# Patient Record
Sex: MALE | Race: WHITE | Employment: FULL TIME | URBAN - METROPOLITAN AREA
[De-identification: names, ages, dates, MRNs, and addresses within clinical notes are randomized per-mention and may not be internally consistent; named-entity substitution may affect disease eponyms.]

---

## 2021-02-03 ENCOUNTER — HOSPITAL ENCOUNTER (EMERGENCY)
Age: 36
Discharge: HOME | End: 2021-02-03
Payer: OTHER GOVERNMENT

## 2021-02-03 ENCOUNTER — APPOINTMENT (OUTPATIENT)
Dept: GENERAL RADIOLOGY | Age: 36
End: 2021-02-03
Payer: OTHER GOVERNMENT

## 2021-02-03 VITALS
SYSTOLIC BLOOD PRESSURE: 136 MMHG | OXYGEN SATURATION: 98 % | BODY MASS INDEX: 26.95 KG/M2 | DIASTOLIC BLOOD PRESSURE: 89 MMHG | WEIGHT: 210 LBS | TEMPERATURE: 96.9 F | RESPIRATION RATE: 16 BRPM | HEIGHT: 74 IN | HEART RATE: 98 BPM

## 2021-02-03 DIAGNOSIS — S63.502A SPRAIN OF LEFT WRIST, INITIAL ENCOUNTER: Primary | ICD-10-CM

## 2021-02-03 PROCEDURE — 99283 EMERGENCY DEPT VISIT LOW MDM: CPT

## 2021-02-03 PROCEDURE — 73110 X-RAY EXAM OF WRIST: CPT

## 2021-02-03 RX ORDER — IBUPROFEN 800 MG/1
800 TABLET ORAL EVERY 8 HOURS PRN
Qty: 20 TABLET | Refills: 0 | Status: SHIPPED | OUTPATIENT
Start: 2021-02-03 | End: 2021-07-08

## 2021-02-03 ASSESSMENT — PAIN DESCRIPTION - LOCATION: LOCATION: WRIST

## 2021-02-03 ASSESSMENT — PAIN SCALES - GENERAL: PAINLEVEL_OUTOF10: 7

## 2021-02-03 ASSESSMENT — ENCOUNTER SYMPTOMS
RHINORRHEA: 0
BACK PAIN: 0
DIARRHEA: 0
PHOTOPHOBIA: 0
NAUSEA: 0
ABDOMINAL PAIN: 0
SORE THROAT: 0
VOMITING: 0
EYE PAIN: 0
SHORTNESS OF BREATH: 0
COUGH: 0

## 2021-02-03 ASSESSMENT — PAIN DESCRIPTION - FREQUENCY: FREQUENCY: CONTINUOUS

## 2021-02-03 ASSESSMENT — PAIN DESCRIPTION - PAIN TYPE: TYPE: ACUTE PAIN

## 2021-02-03 ASSESSMENT — PAIN DESCRIPTION - DESCRIPTORS: DESCRIPTORS: ACHING

## 2021-02-03 ASSESSMENT — PAIN DESCRIPTION - ORIENTATION: ORIENTATION: LEFT

## 2021-02-03 NOTE — ED PROVIDER NOTES
[address] [facility] ED  EMERGENCY DEPARTMENT ENCOUNTER      Pt Name: Kiersten Ornelas  MRN: [id number]  [place] 1985  Date of evaluation: 2/3/2021  Provider: Joseline Amin PA-C      HISTORY OF PRESENT ILLNESS    Kiersten Ornelas is a 30 y.o. male who presents to the Emergency Department with left wrist pain that started yesterday after he fell and caught himself. Denies head injury or any other complains. Pain is worse with movement better with rest. He tried a otc brace with little relief. Pain is moderate 7/10. Denies numbness or tingling. REVIEW OF SYSTEMS       Review of Systems   Constitutional: Negative for chills, diaphoresis, fatigue and fever. HENT: Negative for congestion, rhinorrhea and sore throat. Eyes: Negative for photophobia and pain. Respiratory: Negative for cough and shortness of breath. Cardiovascular: Negative for chest pain and palpitations. Gastrointestinal: Negative for abdominal pain, diarrhea, nausea and vomiting. Genitourinary: Negative for dysuria and flank pain. Musculoskeletal: Positive for arthralgias. Negative for back pain. Skin: Negative for rash. Neurological: Negative for dizziness, light-headedness and headaches. Psychiatric/Behavioral: Negative. All other systems reviewed and are negative. PAST MEDICAL HISTORY   History reviewed. No pertinent past medical history. SURGICAL HISTORY     History reviewed. No pertinent surgical history. CURRENT MEDICATIONS       Previous Medications    No medications on file       ALLERGIES     Patient has no known allergies. FAMILY HISTORY     History reviewed. No pertinent family history.        SOCIAL HISTORY       Social History     Socioeconomic History    Marital status:      Spouse name: None    Number of children: None    Years of education: None    Highest education level: None   Occupational History    None   Social Needs    Financial resource strain: None    Food insecurity     Worry: None     Inability: None    Transportation needs     Medical: None     Non-medical: None   Tobacco Use    Smoking status: Never Smoker    Smokeless tobacco: Never Used   Substance and Sexual Activity    Alcohol use: Yes    Drug use: Never    Sexual activity: Yes   Lifestyle    Physical activity     Days per week: None     Minutes per session: None    Stress: None   Relationships    Social connections     Talks on phone: None     Gets together: None     Attends Yazidi service: None     Active member of club or organization: None     Attends meetings of clubs or organizations: None     Relationship status: None    Intimate partner violence     Fear of current or ex partner: None     Emotionally abused: None     Physically abused: None     Forced sexual activity: None   Other Topics Concern    None   Social History Narrative    None       SCREENINGS             PHYSICAL EXAM    (up to 7 for level 4, 8 or more for level 5)     ED Triage Vitals [02/03/21 1439]   BP Temp Temp Source Pulse Resp SpO2 Height Weight   136/89 96.9 °F (36.1 °C) Temporal 98 16 98 % 6' 2\" (1.88 m) 210 lb (95.3 kg)       Physical Exam  Vitals signs and nursing note reviewed. Constitutional:       General: He is not in acute distress. Appearance: Normal appearance. He is well-developed. He is not diaphoretic. HENT:      Head: Normocephalic and atraumatic. Eyes:      General: Lids are normal.      Conjunctiva/sclera: Conjunctivae normal.   Neck:      Musculoskeletal: Normal range of motion and neck supple. Cardiovascular:      Rate and Rhythm: Normal rate and regular rhythm. Pulses: Normal pulses. Heart sounds: Normal heart sounds. Pulmonary:      Effort: Pulmonary effort is normal.      Breath sounds: Normal breath sounds. Abdominal:      General: Bowel sounds are normal.      Palpations: Abdomen is soft. Tenderness: There is no abdominal tenderness.    Musculoskeletal: Left wrist: He exhibits tenderness and bony tenderness. He exhibits normal range of motion, no swelling, no effusion, no crepitus, no deformity and no laceration. Arms:       Comments: N/v intact. Generalized ttp to wrist. No point tenderness to anatomical snuff box    Lymphadenopathy:      Cervical: No cervical adenopathy. Skin:     General: Skin is warm and dry. Capillary Refill: Capillary refill takes less than 2 seconds. Findings: No rash. Neurological:      Mental Status: He is alert and oriented to person, place, and time. Psychiatric:         Thought Content: Thought content normal.         Judgment: Judgment normal.           All other labs were within normal range or not returned as of this dictation. EMERGENCY DEPARTMENT COURSE and DIFFERENTIALDIAGNOSIS/MDM:   Vitals:    Vitals:    02/03/21 1439   BP: 136/89   Pulse: 98   Resp: 16   Temp: 96.9 °F (36.1 °C)   TempSrc: Temporal   SpO2: 98%   Weight: 210 lb (95.3 kg)   Height: 6' 2\" (1.88 m)            Xray negative. Will treat as a sprain. Given support brace. Rice and nsaids. F/u with pcp or ortho in a few days. PROCEDURES:  Unless otherwise noted below, none     Procedures      FINAL IMPRESSION      1.  Sprain of left wrist, initial encounter          DISPOSITION/PLAN   DISPOSITION Decision To Discharge 02/03/2021 03:22:16 PM          Vinod Arauz PA-C (electronically signed)  Attending Emergency Physician       Vinod Arauz PA-C  02/03/21 5029

## 2021-06-11 ENCOUNTER — OFFICE VISIT (OUTPATIENT)
Dept: ENDOCRINOLOGY | Age: 36
End: 2021-06-11
Payer: OTHER GOVERNMENT

## 2021-06-11 VITALS
DIASTOLIC BLOOD PRESSURE: 77 MMHG | WEIGHT: 180 LBS | HEIGHT: 74 IN | SYSTOLIC BLOOD PRESSURE: 110 MMHG | OXYGEN SATURATION: 98 % | BODY MASS INDEX: 23.1 KG/M2

## 2021-06-11 DIAGNOSIS — E10.65 TYPE I DIABETES MELLITUS WITH HYPEROSMOLAR COMA (HCC): Primary | ICD-10-CM

## 2021-06-11 DIAGNOSIS — E10.69 TYPE I DIABETES MELLITUS WITH HYPEROSMOLAR COMA (HCC): ICD-10-CM

## 2021-06-11 DIAGNOSIS — E10.69 TYPE I DIABETES MELLITUS WITH HYPEROSMOLAR COMA (HCC): Primary | ICD-10-CM

## 2021-06-11 DIAGNOSIS — E87.0 TYPE I DIABETES MELLITUS WITH HYPEROSMOLAR COMA (HCC): Primary | ICD-10-CM

## 2021-06-11 DIAGNOSIS — E10.65 TYPE I DIABETES MELLITUS WITH HYPEROSMOLAR COMA (HCC): ICD-10-CM

## 2021-06-11 DIAGNOSIS — R53.83 FATIGUE, UNSPECIFIED TYPE: ICD-10-CM

## 2021-06-11 DIAGNOSIS — E87.0 TYPE I DIABETES MELLITUS WITH HYPEROSMOLAR COMA (HCC): ICD-10-CM

## 2021-06-11 DIAGNOSIS — R63.4 WEIGHT LOSS: ICD-10-CM

## 2021-06-11 LAB
CHP ED QC CHECK: NORMAL
GLUCOSE BLD-MCNC: 162 MG/DL
T4 FREE: 1.08 NG/DL (ref 0.84–1.68)
TSH SERPL DL<=0.05 MIU/L-ACNC: 3.65 UIU/ML (ref 0.44–3.86)

## 2021-06-11 PROCEDURE — 82962 GLUCOSE BLOOD TEST: CPT | Performed by: INTERNAL MEDICINE

## 2021-06-11 PROCEDURE — 95250 CONT GLUC MNTR PHYS/QHP EQP: CPT | Performed by: INTERNAL MEDICINE

## 2021-06-11 PROCEDURE — 99204 OFFICE O/P NEW MOD 45 MIN: CPT | Performed by: INTERNAL MEDICINE

## 2021-06-11 RX ORDER — BLOOD-GLUCOSE TRANSMITTER
EACH MISCELLANEOUS
Qty: 1 EACH | Refills: 3 | Status: SHIPPED | OUTPATIENT
Start: 2021-06-11

## 2021-06-11 RX ORDER — FLASH GLUCOSE SCANNING READER
EACH MISCELLANEOUS
Qty: 1 DEVICE | Refills: 0 | Status: SHIPPED | OUTPATIENT
Start: 2021-06-11 | End: 2021-07-08

## 2021-06-11 RX ORDER — GLUCAGON INJECTION, SOLUTION 1 MG/.2ML
INJECTION, SOLUTION SUBCUTANEOUS
Qty: 2 SYRINGE | Refills: 3 | Status: SHIPPED | OUTPATIENT
Start: 2021-06-11

## 2021-06-11 RX ORDER — BLOOD-GLUCOSE,RECEIVER,CONT
EACH MISCELLANEOUS
Qty: 1 DEVICE | Refills: 0 | Status: SHIPPED | OUTPATIENT
Start: 2021-06-11

## 2021-06-11 RX ORDER — INSULIN GLARGINE 100 [IU]/ML
INJECTION, SOLUTION SUBCUTANEOUS
Qty: 5 PEN | Refills: 3
Start: 2021-06-11 | End: 2021-07-08 | Stop reason: SDUPTHER

## 2021-06-11 RX ORDER — INSULIN ASPART 100 [IU]/ML
100 INJECTION, SOLUTION INTRAVENOUS; SUBCUTANEOUS 3 TIMES DAILY
COMMUNITY
Start: 2021-06-08 | End: 2021-07-08 | Stop reason: SDUPTHER

## 2021-06-11 RX ORDER — FLASH GLUCOSE SENSOR
KIT MISCELLANEOUS
Qty: 2 EACH | Refills: 3 | Status: SHIPPED | OUTPATIENT
Start: 2021-06-11 | End: 2021-07-08

## 2021-06-11 RX ORDER — INSULIN GLARGINE 100 [IU]/ML
15 INJECTION, SOLUTION SUBCUTANEOUS ONCE
COMMUNITY
Start: 2021-06-07 | End: 2021-06-11 | Stop reason: SDUPTHER

## 2021-06-11 RX ORDER — BLOOD-GLUCOSE SENSOR
EACH MISCELLANEOUS
Qty: 1 EACH | Refills: 11 | Status: SHIPPED | OUTPATIENT
Start: 2021-06-11

## 2021-06-11 ASSESSMENT — ENCOUNTER SYMPTOMS
VISUAL CHANGE: 0
EYES NEGATIVE: 1

## 2021-06-11 NOTE — PROGRESS NOTES
6/11/2021    Assessment:       Diagnosis Orders   1. Type 2 diabetes mellitus with other specified complication, with long-term current use of insulin ([facility])  POCT Glucose    Hemoglobin J7L    Basic Metabolic Panel, Fasting    HM DIABETES FOOT EXAM         PLAN:     Orders Placed This Encounter   Procedures    Basic Metabolic Panel, Fasting     Standing Status:   Future     Standing Expiration Date:   6/11/2022    C-Peptide     Standing Status:   Future     Number of Occurrences:   1     Standing Expiration Date:   6/11/2022    Glutamic Acid Decarboxylase     Standing Status:   Future     Number of Occurrences:   1     Standing Expiration Date:   6/11/2022    T4, Free     Standing Status:   Future     Number of Occurrences:   1     Standing Expiration Date:   6/11/2022    TSH without Reflex     Standing Status:   Future     Number of Occurrences:   1     Standing Expiration Date:   6/11/2022    Thyroid Peroxidase Antibody     Standing Status:   Future     Number of Occurrences:   1     Standing Expiration Date:   6/11/2022    Testosterone Free And Total Male     Standing Status:   Future     Number of Occurrences:   1     Standing Expiration Date:   6/11/2022    Ambulatory referral to Family Practice     Referral Priority:   Routine     Referral Type:   Eval and Treat     Referred to Provider:   aCn Chawla MD     Requested Specialty:   Family Medicine     Number of Visits Requested:   1    POCT Glucose     DIABETES FOOT EXAM    [place] CONT GLUC MNTR PHYSICIAN/QHP PROVIDED EQUIPTMENT     Diabetes education provided today:    Nutrition as a mainstream of diabetes therapy. [place] about label reading. Insulin pumps, how they work and how they affect blood sugar levels. Continuous Glucose monitor. How it works and checks blood sugars every 5 min. for 4 days during our tests. Managing high and low sugar readings.     Orders Placed This Encounter   Medications    Glucagon (Ardella Montgomery 2-PACK) 1 MG/0.2ML SOAJ     Sig: As needed     Dispense:  2 Syringe     Refill:  3    BASAGLAR KWIKPEN 100 UNIT/ML injection pen     Sig: 15 units in am 15 units evening     Dispense:  5 pen     Refill:  3     Lower Basaglar to 15 units twice a day continue NovoLog 10 units with each meals plus sliding scale patient educated extensively regarding insulin pump and continuous glucose monitoring for better control long-term A1c goal of 6.5 or lower educated about hypoglycemia given prescription for subcutaneous glucagon  More than 50% of 45-minute spent patient education counseling      Orders Placed This Encounter   Procedures    POCT Glucose       Subjective:     Chief Complaint   Patient presents with    New Patient    Follow-Up from [id number]:    06/11/21 1337   BP: 110/77   SpO2: 98%   Weight: 180 lb (81.6 kg)   Height: 6' 2\" (1.88 m)     Wt Readings from Last 3 Encounters:   06/11/21 180 lb (81.6 kg)   02/03/21 210 lb (95.3 kg)     BP Readings from Last 3 Encounters:   06/11/21 110/77   02/03/21 136/89     Patient referred here for new onset type 1 diabetes admitted recently at [facility] for diabetic ketoacidosis was in the hospital for 3 days patient symptoms included polyuria polydipsia fatigue weight loss denied any blurred vision was sent home on Basaglar takes 15 units in the morning 20 units at night plus NovoLog 10 units 3 times a day plus sliding scale blood sugars have improved since his admission patient is on low-carb keto diet is interested in continuous glucose monitoring/pump history significant for type 1 diabetes  No labs are available for review currently patient is A1c was close to 15  Patient also works at [employer] 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 He has type 1 diabetes mellitus. His disease course has been improving. There are no hypoglycemic associated symptoms.  Associated symptoms include fatigue, polydipsia, polyuria, weakness and weight loss. Pertinent negatives for diabetes include no visual change. There are no hypoglycemic complications. Current diabetic treatment includes insulin injections. He is currently taking insulin pre-breakfast, pre-lunch, pre-dinner and at bedtime. He is following a generally healthy diet. Meal planning includes avoidance of concentrated sweets. His overall blood glucose range is >200 mg/dl. (Reviewed his blood sugar logs testing 9 times daily averages are around in the 200 range no severe hypoglycemia)     Patient Active Problem List   Diagnosis    DM w/ coma type I, uncontrolled ([facility] [address]         Social History     Socioeconomic History    Marital status:      Spouse name: Not on file    Number of children: Not on file    Years of education: Not on file    Highest education level: Not on file   Occupational History    Not on file   Tobacco Use    Smoking status: Never Smoker    Smokeless tobacco: Never Used   Vaping Use    Vaping Use: Never used   Substance and Sexual Activity    Alcohol use: Yes    Drug use: Never    Sexual activity: Yes   Other Topics Concern    Not on file   Social History Narrative    Not on file     Social Determinants of Health     Financial Resource Strain:     Difficulty of Paying Living Expenses:    Food Insecurity:     Worried About Running Out of Food in the Last Year:     [address] in the Last Year:    Transportation Needs:     Lack of Transportation (Medical):      Lack of Transportation (Non-Medical):    Physical Activity:     Days of Exercise per Week:     Minutes of Exercise per Session:    Stress:     Feeling of Stress :    Social Connections:     Frequency of Communication with Friends and Family:     Frequency of Social Gatherings with Friends and Family:     Attends Tenriism Services:     Active Member of Clubs or Organizations:     Attends Club or Organization Meetings:     Marital Status:    Intimate Normal range of motion. Cervical back: Normal range of motion and neck supple. Feet:    Skin:     General: Skin is warm and dry. Neurological:      General: No focal deficit present. Mental Status: He is alert and oriented to person, place, and time.    Psychiatric:         Mood and Affect: Mood normal.         Behavior: Behavior normal.

## 2021-06-12 LAB — C-PEPTIDE: 0.8 NG/ML (ref 1.1–4.4)

## 2021-06-13 LAB
SEX HORMONE BINDING GLOBULIN: 26 NMOL/L (ref 11–80)
TESTOSTERONE FREE PERCENT: 2 % (ref 1.6–2.9)
TESTOSTERONE FREE, CALC: 61 PG/ML (ref 47–244)
TESTOSTERONE TOTAL-MALE: 300 NG/DL (ref 300–1080)

## 2021-06-14 LAB
GLUTAMIC ACID DECARB AB: 6 IU/ML (ref 0–5)
THYROID PEROXIDASE (TPO) ABS: <4 IU/ML (ref 0–25)

## 2021-06-24 ENCOUNTER — NURSE ONLY (OUTPATIENT)
Dept: ENDOCRINOLOGY | Age: 36
End: 2021-06-24
Payer: OTHER GOVERNMENT

## 2021-06-24 DIAGNOSIS — E10.69 TYPE I DIABETES MELLITUS WITH HYPEROSMOLAR COMA (HCC): Primary | ICD-10-CM

## 2021-06-24 DIAGNOSIS — E87.0 TYPE I DIABETES MELLITUS WITH HYPEROSMOLAR COMA (HCC): Primary | ICD-10-CM

## 2021-06-24 DIAGNOSIS — E10.65 TYPE I DIABETES MELLITUS WITH HYPEROSMOLAR COMA (HCC): Primary | ICD-10-CM

## 2021-06-24 PROCEDURE — 95251 CONT GLUC MNTR ANALYSIS I&R: CPT | Performed by: INTERNAL MEDICINE

## 2021-06-24 RX ORDER — PEN NEEDLE, DIABETIC 32GX 5/32"
NEEDLE, DISPOSABLE MISCELLANEOUS
Qty: 100 EACH | Refills: 3 | Status: SHIPPED | OUTPATIENT
Start: 2021-06-24 | End: 2021-07-29 | Stop reason: SDUPTHER

## 2021-06-24 RX ORDER — BLOOD SUGAR DIAGNOSTIC
STRIP MISCELLANEOUS
Qty: 150 EACH | Refills: 3 | Status: SHIPPED | OUTPATIENT
Start: 2021-06-24

## 2021-07-08 ENCOUNTER — OFFICE VISIT (OUTPATIENT)
Dept: ENDOCRINOLOGY | Age: 36
End: 2021-07-08
Payer: OTHER GOVERNMENT

## 2021-07-08 VITALS — HEART RATE: 66 BPM | WEIGHT: 193 LBS | BODY MASS INDEX: 24.77 KG/M2 | HEIGHT: 74 IN | OXYGEN SATURATION: 97 %

## 2021-07-08 DIAGNOSIS — E10.65 TYPE I DIABETES MELLITUS WITH HYPEROSMOLAR COMA (HCC): Primary | ICD-10-CM

## 2021-07-08 DIAGNOSIS — E10.69 TYPE I DIABETES MELLITUS WITH HYPEROSMOLAR COMA (HCC): Primary | ICD-10-CM

## 2021-07-08 DIAGNOSIS — E87.0 TYPE I DIABETES MELLITUS WITH HYPEROSMOLAR COMA (HCC): Primary | ICD-10-CM

## 2021-07-08 LAB
CHP ED QC CHECK: NORMAL
GLUCOSE BLD-MCNC: 101 MG/DL
HBA1C MFR BLD: 9.4 %

## 2021-07-08 PROCEDURE — 82962 GLUCOSE BLOOD TEST: CPT | Performed by: INTERNAL MEDICINE

## 2021-07-08 PROCEDURE — 83036 HEMOGLOBIN GLYCOSYLATED A1C: CPT | Performed by: INTERNAL MEDICINE

## 2021-07-08 PROCEDURE — 99214 OFFICE O/P EST MOD 30 MIN: CPT | Performed by: INTERNAL MEDICINE

## 2021-07-08 RX ORDER — INSULIN GLARGINE 100 [IU]/ML
INJECTION, SOLUTION SUBCUTANEOUS
Qty: 5 PEN | Refills: 3
Start: 2021-07-08 | End: 2021-07-14 | Stop reason: SDUPTHER

## 2021-07-08 RX ORDER — INSULIN ASPART 100 [IU]/ML
INJECTION, SOLUTION INTRAVENOUS; SUBCUTANEOUS
Qty: 5 PEN | Refills: 3
Start: 2021-07-08 | End: 2021-09-24

## 2021-07-08 NOTE — PROGRESS NOTES
2021    Assessment:       Diagnosis Orders   1. Type I diabetes mellitus with hyperosmolar coma (HCC)  POCT Glucose    POCT glycosylated hemoglobin (Hb A1C)    Microalbumin / Creatinine Urine Ratio    Lipid, Fasting    Basic Metabolic Panel, Fasting         PLAN:     Orders Placed This Encounter   Procedures    Microalbumin / Creatinine Urine Ratio     Standing Status:   Future     Standing Expiration Date:   2022    Lipid, Fasting     Standing Status:   Future     Standing Expiration Date:   2022    Basic Metabolic Panel, Fasting     Standing Status:   Future     Standing Expiration Date:   2022    POCT Glucose    POCT glycosylated hemoglobin (Hb A1C)     Adjust dose of NovoLog to 40 units in the morning 8 at lunch 14 at dinner plus sliding scale adjust Basaglar to 12 in the morning 20 2 in the evening  Patient also was given samples of Afrezza insulin which she could use instead of NovoLog currently using same dose of NovoLog  Will get a baseline PFT  Discussed about insulin pump patient wants to hold off on  Repeat BMP A1c in 3 weeks wants to get A1c below 9  For the 50% of 30 minutes spent patient education counseling  Orders Placed This Encounter   Medications    NOVOLOG FLEXPEN 100 UNIT/ML injection pen     Si units am 8 units lunch and 14 units dinner plus sliding scale     Dispense:  5 pen     Refill:  3    BASAGLAR KWIKPEN 100 UNIT/ML injection pen     Si units in am 22 units evening     Dispense:  5 pen     Refill:  3    Insulin Regular Human 4 & 8 & 12 units POWD     Sig: Take 16 units  Am 8 units lunch and 16 units dinner     Dispense:  60 each     Refill:  3           Orders Placed This Encounter   Procedures    POCT Glucose    POCT glycosylated hemoglobin (Hb A1C)     No orders of the defined types were placed in this encounter. No follow-ups on file.   Subjective:     Chief Complaint   Patient presents with    Diabetes     Vitals:    21 1502   Weight: 193 lb (87.5 kg)   Height: 6' 2\" (1.88 m)     Wt Readings from Last 3 Encounters:   07/08/21 193 lb (87.5 kg)   06/11/21 180 lb (81.6 kg)   02/03/21 210 lb (95.3 kg)     BP Readings from Last 3 Encounters:   06/11/21 110/77   02/03/21 136/89     Follow-up on type 1 diabetes patient is Lantus twice a day and NovoLog 3 times a day also using Dexcom continuous glucose monitoring patient also had freestyle massiel continuous glucose monitoring which we reviewed with him including his Dexcom report  Overall trend is improving A1c is also improved significantly  Follow-up current blood sugars are higher late night early morning and later in the evening  A1c has improved to 9.4 from above 14    Diabetes  He presents for his follow-up diabetic visit. He has type 1 diabetes mellitus. There are no hypoglycemic complications. There are no diabetic complications. Current diabetic treatment includes insulin injections. He is currently taking insulin pre-breakfast, pre-lunch, pre-dinner and at bedtime. His overall blood glucose range is 140-180 mg/dl. (Lab Results       Component                Value               Date                       LABA1C                   9.4                 07/08/2021            Review Dexcom reading average blood sugars are close to normal 170 lowest blood sugar was 65 highest was 2 8868% time in range less than 1% low 0% very low  Reviewed freestyle massiel readings average blood sugars in the 165  Range  61% time in range  31% high  1% low  0% very low)     Past Medical History:   Diagnosis Date    DM w/ coma type I, uncontrolled ([address] 6/11/2021     History reviewed. No pertinent surgical history.   Social History     Socioeconomic History    Marital status:      Spouse name: Not on file    Number of children: Not on file    Years of education: Not on file    Highest education level: Not on file   Occupational History    Not on file   Tobacco Use    Smoking status: Never Smoker    Smokeless tobacco: Never Used   Vaping Use    Vaping Use: Never used   Substance and Sexual Activity    Alcohol use: Yes    Drug use: Never    Sexual activity: Yes   Other Topics Concern    Not on file   Social History Narrative    Not on file     Social Determinants of Health     Financial Resource Strain:     Difficulty of Paying Living Expenses:    Food Insecurity:     Worried About Running Out of Food in the Last Year:     [address] in the Last Year:    Transportation Needs:     Lack of Transportation (Medical):  Lack of Transportation (Non-Medical):    Physical Activity:     Days of Exercise per Week:     Minutes of Exercise per Session:    Stress:     Feeling of Stress :    Social Connections:     Frequency of Communication with Friends and Family:     Frequency of Social Gatherings with Friends and Family:     Attends Restorationism Services:     Active Member of Clubs or Organizations:     Attends Club or Organization Meetings:     Marital Status:    Intimate Partner Violence:     Fear of Current or Ex-Partner:     Emotionally Abused:     Physically Abused:     Sexually Abused:      History reviewed. No pertinent family history.   No Known Allergies    Current Outpatient Medications:     Insulin Pen Needle (RELION PEN NEEDLES) 31G X 6 MM MISC, Use 5 daily with insulin, Disp: 100 each, Rfl: 3    blood glucose test strips (ACCU-CHEK GUIDE) strip, Pt test 4x daily Dx E10.65, Disp: 150 each, Rfl: 3    NOVOLOG FLEXPEN 100 UNIT/ML injection pen, Inject 100 Units into the skin 3 times daily, Disp: , Rfl:     Glucagon (GVOKE HYPOPEN 2-PACK) 1 MG/0.2ML SOAJ, As needed, Disp: 2 Syringe, Rfl: 3    BASAGLAR KWIKPEN 100 UNIT/ML injection pen, 15 units in am 15 units evening, Disp: 5 pen, Rfl: 3    Continuous Blood Gluc Transmit (DEXCOM G6 TRANSMITTER) MISC, Change every 3 months, Disp: 1 each, Rfl: 3    Continuous Blood Gluc Sensor (DEXCOM G6 SENSOR) MISC, Change every 10 days, Disp: 1 each, Rfl: 11    Continuous Blood Gluc  (DEXCOM G6 ) CORY, Continuous, Disp: 1 Device, Rfl: 0  Lab Results   Component Value Date    GLUCOSE 101 07/08/2021     No results found for: WBC, HGB, HCT, MCV, PLT  Lab Results   Component Value Date    LABA1C 9.4 07/08/2021     No results found for: CHOLFAST, TRIGLYCFAST, HDL, LDLCALC, CHOL, TRIG  Lab Results   Component Value Date    TESTM 300 06/11/2021     Lab Results   Component Value Date    TSH 3.650 06/11/2021    T4FREE 1.08 06/11/2021     Lab Results   Component Value Date    TPOABS <4.0 06/11/2021       Review of Systems   Cardiovascular: Negative. Endocrine: Negative. All other systems reviewed and are negative. Objective:   Physical Exam  Vitals reviewed. Constitutional:       Appearance: Normal appearance. HENT:      Head: Normocephalic and atraumatic. Hair is normal.      Right Ear: External ear normal.      Left Ear: External ear normal.      Nose: Nose normal.   Eyes:      General: No scleral icterus. Right eye: No discharge. Left eye: No discharge. Extraocular Movements: Extraocular movements intact. Conjunctiva/sclera: Conjunctivae normal.   Neck:      Trachea: Trachea normal.   Cardiovascular:      Rate and Rhythm: Normal rate. Pulmonary:      Effort: Pulmonary effort is normal.   Musculoskeletal:         General: Normal range of motion. Cervical back: Normal range of motion and neck supple. Neurological:      General: No focal deficit present. Mental Status: He is alert and oriented to person, place, and time.    Psychiatric:         Mood and Affect: Mood normal.         Behavior: Behavior normal.

## 2021-07-14 RX ORDER — INSULIN GLARGINE 100 [IU]/ML
INJECTION, SOLUTION SUBCUTANEOUS
Qty: 2 PEN | Refills: 0 | COMMUNITY
Start: 2021-07-14 | End: 2022-07-29

## 2021-07-14 RX ORDER — INSULIN GLARGINE 100 [IU]/ML
INJECTION, SOLUTION SUBCUTANEOUS
Qty: 10 PEN | Refills: 3 | Status: SHIPPED | OUTPATIENT
Start: 2021-07-14 | End: 2021-07-14 | Stop reason: SDUPTHER

## 2021-07-22 DIAGNOSIS — E10.69 TYPE I DIABETES MELLITUS WITH HYPEROSMOLAR COMA (HCC): ICD-10-CM

## 2021-07-22 DIAGNOSIS — E87.0 TYPE I DIABETES MELLITUS WITH HYPEROSMOLAR COMA (HCC): ICD-10-CM

## 2021-07-22 DIAGNOSIS — E10.65 TYPE I DIABETES MELLITUS WITH HYPEROSMOLAR COMA (HCC): ICD-10-CM

## 2021-07-22 LAB
ANION GAP SERPL CALCULATED.3IONS-SCNC: 16 MEQ/L (ref 9–15)
BUN BLDV-MCNC: 15 MG/DL (ref 6–20)
CALCIUM SERPL-MCNC: 9.8 MG/DL (ref 8.5–9.9)
CHLORIDE BLD-SCNC: 100 MEQ/L (ref 95–107)
CHOLESTEROL, FASTING: 166 MG/DL (ref 0–199)
CO2: 26 MEQ/L (ref 20–31)
CREAT SERPL-MCNC: 0.8 MG/DL (ref 0.7–1.2)
CREATININE URINE: 28.6 MG/DL
GFR AFRICAN AMERICAN: >60
GFR NON-AFRICAN AMERICAN: >60
GLUCOSE FASTING: 127 MG/DL (ref 70–99)
HBA1C MFR BLD: 8 % (ref 4.8–5.9)
HDLC SERPL-MCNC: 55 MG/DL (ref 40–59)
LDL CHOLESTEROL CALCULATED: 99 MG/DL (ref 0–129)
MICROALBUMIN UR-MCNC: <1.2 MG/DL
MICROALBUMIN/CREAT UR-RTO: NORMAL MG/G (ref 0–30)
POTASSIUM SERPL-SCNC: 3.9 MEQ/L (ref 3.4–4.9)
SODIUM BLD-SCNC: 142 MEQ/L (ref 135–144)
TRIGLYCERIDE, FASTING: 58 MG/DL (ref 0–150)

## 2021-07-29 RX ORDER — PEN NEEDLE, DIABETIC 32GX 5/32"
NEEDLE, DISPOSABLE MISCELLANEOUS
Qty: 100 EACH | Refills: 3 | Status: SHIPPED | OUTPATIENT
Start: 2021-07-29

## 2021-08-05 ENCOUNTER — OFFICE VISIT (OUTPATIENT)
Dept: ENDOCRINOLOGY | Age: 36
End: 2021-08-05
Payer: OTHER GOVERNMENT

## 2021-08-05 VITALS
BODY MASS INDEX: 23.61 KG/M2 | WEIGHT: 184 LBS | HEART RATE: 98 BPM | OXYGEN SATURATION: 98 % | SYSTOLIC BLOOD PRESSURE: 130 MMHG | DIASTOLIC BLOOD PRESSURE: 82 MMHG | HEIGHT: 74 IN | TEMPERATURE: 96.8 F

## 2021-08-05 DIAGNOSIS — E10.65 TYPE I DIABETES MELLITUS WITH HYPEROSMOLAR COMA (HCC): Primary | ICD-10-CM

## 2021-08-05 DIAGNOSIS — E87.0 TYPE I DIABETES MELLITUS WITH HYPEROSMOLAR COMA (HCC): Primary | ICD-10-CM

## 2021-08-05 DIAGNOSIS — E10.69 TYPE I DIABETES MELLITUS WITH HYPEROSMOLAR COMA (HCC): Primary | ICD-10-CM

## 2021-08-05 LAB
CHP ED QC CHECK: NORMAL
GLUCOSE BLD-MCNC: 114 MG/DL

## 2021-08-05 PROCEDURE — 82962 GLUCOSE BLOOD TEST: CPT | Performed by: INTERNAL MEDICINE

## 2021-08-05 PROCEDURE — 99213 OFFICE O/P EST LOW 20 MIN: CPT | Performed by: INTERNAL MEDICINE

## 2021-08-05 PROCEDURE — 3052F HG A1C>EQUAL 8.0%<EQUAL 9.0%: CPT | Performed by: INTERNAL MEDICINE

## 2021-08-05 NOTE — PROGRESS NOTES
8/5/2021    Assessment:       Diagnosis Orders   1. Type I diabetes mellitus with hyperosmolar coma (HCC)  POCT Glucose    Hemoglobin U8V    Basic Metabolic Panel, Fasting         PLAN:     Orders Placed This Encounter   Procedures    Hemoglobin A1C     Standing Status:   Future     Standing Expiration Date:   8/5/2022    Basic Metabolic Panel, Fasting     Standing Status:   Future     Standing Expiration Date:   8/5/2022    Microalbumin / Creatinine Urine Ratio     Standing Status:   Future     Standing Expiration Date:   8/5/2022    POCT Glucose     Continue current dose of Basaglar at 12 units in the morning 22 units in the evening plus Afrezza  3 times a day  A1c goal of 7 or lower        Orders Placed This Encounter   Procedures    POCT Glucose       Subjective:     Chief Complaint   Patient presents with    Diabetes     Vitals:    08/05/21 1423   BP: 130/82   Pulse: 98   Temp: 96.8 °F (36 °C)   SpO2: 98%   Weight: 184 lb (83.5 kg)   Height: 6' 2\" (1.88 m)     Wt Readings from Last 3 Encounters:   08/05/21 184 lb (83.5 kg)   07/08/21 193 lb (87.5 kg)   06/11/21 180 lb (81.6 kg)     BP Readings from Last 3 Encounters:   08/05/21 130/82   06/11/21 110/77   02/03/21 136/89     Reviewed 4 week cgms dexcom  Patient on Basaglar 12 units in the morning 22 at night plus    Inhaler insulin 3 times daily    Diabetes  He presents for his follow-up diabetic visit. He has type 1 diabetes mellitus. Symptoms are improving. Current diabetic treatment includes insulin injections. He is currently taking insulin pre-breakfast, pre-lunch, pre-dinner and at bedtime. His overall blood glucose range is 130-140 mg/dl.  (Lab Results       Component                Value               Date                       LABA1C                   8.0 (H)             07/22/2021              30 day average was 147   82 % in range   )     Past Medical History:   Diagnosis Date    DM w/ coma type I, uncontrolled ([address] 6/11/2021     No past surgical history on file. Social History     Socioeconomic History    Marital status:      Spouse name: Not on file    Number of children: Not on file    Years of education: Not on file    Highest education level: Not on file   Occupational History    Not on file   Tobacco Use    Smoking status: Never Smoker    Smokeless tobacco: Never Used   Vaping Use    Vaping Use: Never used   Substance and Sexual Activity    Alcohol use: Yes    Drug use: Never    Sexual activity: Yes   Other Topics Concern    Not on file   Social History Narrative    Not on file     Social Determinants of Health     Financial Resource Strain:     Difficulty of Paying Living Expenses:    Food Insecurity:     Worried About Running Out of Food in the Last Year:     [address] in the Last Year:    Transportation Needs:     Lack of Transportation (Medical):  Lack of Transportation (Non-Medical):    Physical Activity:     Days of Exercise per Week:     Minutes of Exercise per Session:    Stress:     Feeling of Stress :    Social Connections:     Frequency of Communication with Friends and Family:     Frequency of Social Gatherings with Friends and Family:     Attends Judaism Services:     Active Member of Clubs or Organizations:     Attends Club or Organization Meetings:     Marital Status:    Intimate Partner Violence:     Fear of Current or Ex-Partner:     Emotionally Abused:     Physically Abused:     Sexually Abused:      No family history on file.   No Known Allergies    Current Outpatient Medications:     Insulin Pen Needle (RELION PEN NEEDLES) 31G X 6 MM MISC, Use 5 daily with insulin, Disp: 100 each, Rfl: 3    BASAGLAR KWIKPEN 100 UNIT/ML injection pen, Lot [id number] exp 8/21, Disp: 2 pen, Rfl: 0    AFREZZA 4 & 8 & 12 units POWD, Lot [id number] exp 9/21, Disp: 2 each, Rfl: 0    NOVOLOG FLEXPEN 100 UNIT/ML injection pen, 14 units am 8 units lunch and 14 units dinner plus sliding scale, Disp: 5 movements intact. Conjunctiva/sclera: Conjunctivae normal.   Neck:      Trachea: Trachea normal.   Cardiovascular:      Rate and Rhythm: Normal rate. Pulmonary:      Effort: Pulmonary effort is normal.   Musculoskeletal:         General: Normal range of motion. Cervical back: Normal range of motion and neck supple. Neurological:      General: No focal deficit present. Mental Status: He is alert and oriented to person, place, and time.    Psychiatric:         Mood and Affect: Mood normal.         Behavior: Behavior normal.

## 2021-09-24 ENCOUNTER — OFFICE VISIT (OUTPATIENT)
Dept: FAMILY MEDICINE CLINIC | Age: 36
End: 2021-09-24
Payer: OTHER GOVERNMENT

## 2021-09-24 VITALS
TEMPERATURE: 97.9 F | WEIGHT: 176 LBS | SYSTOLIC BLOOD PRESSURE: 120 MMHG | HEART RATE: 104 BPM | OXYGEN SATURATION: 98 % | DIASTOLIC BLOOD PRESSURE: 80 MMHG | BODY MASS INDEX: 22.59 KG/M2 | HEIGHT: 74 IN

## 2021-09-24 DIAGNOSIS — Z02.89 ENCOUNTER FOR PHYSICAL EXAMINATION RELATED TO EMPLOYMENT: Primary | ICD-10-CM

## 2021-09-24 DIAGNOSIS — Z00.00 ENCOUNTER FOR MEDICAL EXAMINATION TO ESTABLISH CARE: ICD-10-CM

## 2021-09-24 PROCEDURE — 99385 PREV VISIT NEW AGE 18-39: CPT | Performed by: FAMILY MEDICINE

## 2021-09-24 SDOH — ECONOMIC STABILITY: FOOD INSECURITY: WITHIN THE PAST 12 MONTHS, YOU WORRIED THAT YOUR FOOD WOULD RUN OUT BEFORE YOU GOT MONEY TO BUY MORE.: NEVER TRUE

## 2021-09-24 SDOH — ECONOMIC STABILITY: FOOD INSECURITY: WITHIN THE PAST 12 MONTHS, THE FOOD YOU BOUGHT JUST DIDN'T LAST AND YOU DIDN'T HAVE MONEY TO GET MORE.: NEVER TRUE

## 2021-09-24 ASSESSMENT — PATIENT HEALTH QUESTIONNAIRE - PHQ9
SUM OF ALL RESPONSES TO PHQ QUESTIONS 1-9: 0
1. LITTLE INTEREST OR PLEASURE IN DOING THINGS: 0
SUM OF ALL RESPONSES TO PHQ9 QUESTIONS 1 & 2: 0
2. FEELING DOWN, DEPRESSED OR HOPELESS: 0

## 2021-09-24 ASSESSMENT — ENCOUNTER SYMPTOMS
SHORTNESS OF BREATH: 0
ABDOMINAL PAIN: 0
CONSTIPATION: 0
COUGH: 0
RHINORRHEA: 0
WHEEZING: 0
SORE THROAT: 0
DIARRHEA: 0

## 2021-09-24 ASSESSMENT — SOCIAL DETERMINANTS OF HEALTH (SDOH): HOW HARD IS IT FOR YOU TO PAY FOR THE VERY BASICS LIKE FOOD, HOUSING, MEDICAL CARE, AND HEATING?: NOT HARD AT ALL

## 2021-09-24 NOTE — PROGRESS NOTES
[address] [facility]  [address] [place] [id number]  Dept: [phone]  Dept Fax: [phone]: [phone]     Chief Complaint  Chief Complaint   Patient presents with    New Patient    Annual Exam     physical for 's license       HPI:  30 y.o.male who presents for the following:  (establish)    DM1: sees Dr. Gretel Ruffin (endocrine); diagnosed last year; last a1c 8.0 this summer with average sugar in the 150 per his CGM; No excessive thirst, urination, or blurry vision    Works as an . 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 Review of Systems   Constitutional: Negative for chills and fever. HENT: Negative for congestion, rhinorrhea and sore throat. Respiratory: Negative for cough, shortness of breath and wheezing. Gastrointestinal: Negative for abdominal pain, constipation and diarrhea. Endocrine: Negative for polydipsia and polyuria. Genitourinary: Negative for dysuria, frequency and urgency. Neurological: Negative for syncope, light-headedness, numbness and headaches. Psychiatric/Behavioral: Negative for sleep disturbance. The patient is not nervous/anxious. Past Medical History:   Diagnosis Date    DM w/ coma type I, uncontrolled ([facility] [address] 6/11/2021     No past surgical history on file. Social History     Socioeconomic History    Marital status:      Spouse name: Not on file    Number of children: Not on file    Years of education: Not on file    Highest education level: Not on file   Occupational History    Not on file   Tobacco Use    Smoking status: Never Smoker    Smokeless tobacco: Never Used   Vaping Use    Vaping Use: Never used   Substance and Sexual Activity    Alcohol use:  Yes    Drug use: Never    Sexual activity: Yes   Other Topics Concern    Not on file   Social History Narrative    Not on file Social Determinants of Health     Financial Resource Strain: Low Risk     Difficulty of Paying Living Expenses: Not hard at all   Food Insecurity: No Food Insecurity    Worried About Running Out of Food in the Last Year: Never true    [place] of Food in the Last Year: Never true   Transportation Needs:     Lack of Transportation (Medical):  Lack of Transportation (Non-Medical):    Physical Activity:     Days of Exercise per Week:     Minutes of Exercise per Session:    Stress:     Feeling of Stress :    Social Connections:     Frequency of Communication with Friends and Family:     Frequency of Social Gatherings with Friends and Family:     Attends Restorationism Services:     Active Member of Clubs or Organizations:     Attends Club or Organization Meetings:     Marital Status:    Intimate Partner Violence:     Fear of Current or Ex-Partner:     Emotionally Abused:     Physically Abused:     Sexually Abused:      No family history on file. No Known Allergies  Current Outpatient Medications   Medication Sig Dispense Refill   Jenna FAROOQ 100 UNIT/ML injection pen Lot [id number] exp 8/21 2 pen 0    AFREZZA 4 & 8 & 12 units POWD Lot [id number] exp 9/21 2 each 0    Insulin Regular Human 4 & 8 & 12 units POWD Take 16 units  Am 8 units lunch and 16 units dinner 60 each 3    Glucagon (GVOKE HYPOPEN 2-PACK) 1 MG/0.2ML SOAJ As needed 2 Syringe 3    Insulin Pen Needle (RELION PEN NEEDLES) 31G X 6 MM MISC Use 5 daily with insulin 100 each 3    blood glucose test strips (ACCU-CHEK GUIDE) strip Pt test 4x daily Dx E10.65 150 each 3    Continuous Blood Gluc Transmit (DEXCOM G6 TRANSMITTER) MISC Change every 3 months 1 each 3    Continuous Blood Gluc Sensor (DEXCOM G6 SENSOR) MISC Change every 10 days 1 each 11    Continuous Blood Gluc  (DEXCOM G6 ) CORY Continuous 1 Device 0     No current facility-administered medications for this visit.          Vitals:    09/24/21 1355   BP: 120/80   Pulse: 104   Temp: 97.9 °F (36.6 °C)   TempSrc: Infrared   SpO2: 98%   Weight: 176 lb (79.8 kg)   Height: 6' 2\" (1.88 m)       Physical exam:  Physical Exam  Vitals reviewed. Constitutional:       General: He is not in acute distress. Appearance: He is well-developed. HENT:      Head: Normocephalic and atraumatic. Right Ear: Tympanic membrane, ear canal and external ear normal.      Left Ear: Tympanic membrane, ear canal and external ear normal.      Mouth/Throat:      Pharynx: No oropharyngeal exudate. Neck:      Thyroid: No thyromegaly. Cardiovascular:      Rate and Rhythm: Normal rate and regular rhythm. Heart sounds: Normal heart sounds. No murmur heard. Pulmonary:      Effort: Pulmonary effort is normal. No respiratory distress. Breath sounds: Normal breath sounds. No wheezing. Abdominal:      General: There is no distension. Palpations: Abdomen is soft. Tenderness: There is no abdominal tenderness. There is no guarding or rebound. Musculoskeletal:      Cervical back: Normal range of motion. Lymphadenopathy:      Cervical: No cervical adenopathy. Skin:     General: Skin is warm and dry. Neurological:      Mental Status: He is alert and oriented to person, place, and time. Psychiatric:         Behavior: Behavior normal.         Assessment/Plan:  30 y.o. male here mainly for establish and physical:  - this is just a basic physical and not a 1/2/3 class  physical; his exam was benign; his sugars have been good. He appears healthy enough to continue with what he is doing; his forms were filled to reflect this. Diagnosis Orders   1. Encounter for physical examination related to employment     2.  Encounter for medical examination to establish care          Return in about 1 year (around 9/24/2022), or if symptoms worsen or fail to improve, for annual exam.    Hermelindo Chaudhary MD

## 2021-11-04 ENCOUNTER — OFFICE VISIT (OUTPATIENT)
Dept: ENDOCRINOLOGY | Age: 36
End: 2021-11-04
Payer: OTHER GOVERNMENT

## 2021-11-04 VITALS
HEIGHT: 74 IN | OXYGEN SATURATION: 94 % | HEART RATE: 95 BPM | BODY MASS INDEX: 23.1 KG/M2 | WEIGHT: 180 LBS | DIASTOLIC BLOOD PRESSURE: 71 MMHG | SYSTOLIC BLOOD PRESSURE: 134 MMHG

## 2021-11-04 DIAGNOSIS — E10.65 TYPE I DIABETES MELLITUS WITH HYPEROSMOLAR COMA (HCC): Primary | ICD-10-CM

## 2021-11-04 DIAGNOSIS — E10.69 TYPE I DIABETES MELLITUS WITH HYPEROSMOLAR COMA (HCC): Primary | ICD-10-CM

## 2021-11-04 DIAGNOSIS — E87.0 TYPE I DIABETES MELLITUS WITH HYPEROSMOLAR COMA (HCC): Primary | ICD-10-CM

## 2021-11-04 LAB
CHP ED QC CHECK: NORMAL
GLUCOSE BLD-MCNC: 172 MG/DL
HBA1C MFR BLD: 6.3 %

## 2021-11-04 PROCEDURE — 82962 GLUCOSE BLOOD TEST: CPT | Performed by: INTERNAL MEDICINE

## 2021-11-04 PROCEDURE — 99213 OFFICE O/P EST LOW 20 MIN: CPT | Performed by: INTERNAL MEDICINE

## 2021-11-04 PROCEDURE — 83036 HEMOGLOBIN GLYCOSYLATED A1C: CPT | Performed by: INTERNAL MEDICINE

## 2021-11-04 NOTE — PROGRESS NOTES
11/4/2021    Assessment:       Diagnosis Orders   1. Type I diabetes mellitus with hyperosmolar coma (HCC)  POCT Glucose    POCT glycosylated hemoglobin (Hb A1C)    Hemoglobin G0E    Basic Metabolic Panel, Fasting         PLAN:     Continue current dose regimen basaglar       Orders Placed This Encounter   Procedures    POCT Glucose    POCT glycosylated hemoglobin (Hb A1C)       Subjective:     Chief Complaint   Patient presents with    Diabetes     Vitals:    11/04/21 1446   BP: 134/71   Pulse: 95   SpO2: 94%   Weight: 180 lb (81.6 kg)   Height: 6' 2\" (1.88 m)     Wt Readings from Last 3 Encounters:   11/04/21 180 lb (81.6 kg)   09/24/21 176 lb (79.8 kg)   08/05/21 184 lb (83.5 kg)     BP Readings from Last 3 Encounters:   11/04/21 134/71   09/24/21 120/80   08/05/21 130/82     F/u on type 1 dm glucose stable had eye exam done recently normal hbaic stable    Diabetes  He presents for his follow-up diabetic visit. He has type 1 diabetes mellitus. Current diabetic treatment includes insulin injections. He is currently taking insulin pre-breakfast, pre-lunch, pre-dinner and at bedtime. His overall blood glucose range is 130-140 mg/dl. (Lab Results       Component                Value               Date                       LABA1C                   6.4 (H)             11/11/2021              )     Past Medical History:   Diagnosis Date    DM w/ coma type I, uncontrolled ([facility] [address] 6/11/2021     History reviewed. No pertinent surgical history. Social History     Socioeconomic History    Marital status:      Spouse name: Not on file    Number of children: Not on file    Years of education: Not on file    Highest education level: Not on file   Occupational History    Not on file   Tobacco Use    Smoking status: Never Smoker    Smokeless tobacco: Never Used   Vaping Use    Vaping Use: Never used   Substance and Sexual Activity    Alcohol use:  Yes    Drug use: Never    Sexual activity: Yes   Other Topics Concern    Not on file   Social History Narrative    Not on file     Social Determinants of Health     Financial Resource Strain: Low Risk     Difficulty of Paying Living Expenses: Not hard at all   Food Insecurity: No Food Insecurity    Worried About Running Out of Food in the Last Year: Never true    [address] in the Last Year: Never true   Transportation Needs:     Lack of Transportation (Medical):  Lack of Transportation (Non-Medical):    Physical Activity:     Days of Exercise per Week:     Minutes of Exercise per Session:    Stress:     Feeling of Stress :    Social Connections:     Frequency of Communication with Friends and Family:     Frequency of Social Gatherings with Friends and Family:     Attends Denominational Services:     Active Member of Clubs or Organizations:     Attends Club or Organization Meetings:     Marital Status:    Intimate Partner Violence:     Fear of Current or Ex-Partner:     Emotionally Abused:     Physically Abused:     Sexually Abused:      History reviewed. No pertinent family history.   No Known Allergies    Current Outpatient Medications:     Insulin Pen Needle (RELION PEN NEEDLES) 31G X 6 MM MISC, Use 5 daily with insulin, Disp: 100 each, Rfl: 3    BASAGLAR KWIKPEN 100 UNIT/ML injection pen, Lot [id number] exp 8/21, Disp: 2 pen, Rfl: 0    AFREZZA 4 & 8 & 12 units POWD, Lot [id number] exp 9/21, Disp: 2 each, Rfl: 0    Insulin Regular Human 4 & 8 & 12 units POWD, Take 16 units  Am 8 units lunch and 16 units dinner, Disp: 60 each, Rfl: 3    blood glucose test strips (ACCU-CHEK GUIDE) strip, Pt test 4x daily Dx E10.65, Disp: 150 each, Rfl: 3    Glucagon (GVOKE HYPOPEN 2-PACK) 1 MG/0.2ML SOAJ, As needed, Disp: 2 Syringe, Rfl: 3    Continuous Blood Gluc Transmit (DEXCOM G6 TRANSMITTER) MISC, Change every 3 months, Disp: 1 each, Rfl: 3    Continuous Blood Gluc Sensor (DEXCOM G6 SENSOR) MISC, Change every 10 days, Disp: 1 each, Rfl: 11    Continuous Blood Gluc  (DEXCOM G6 ) CORY, Continuous, Disp: 1 Device, Rfl: 0  Lab Results   Component Value Date     07/22/2021    K 3.9 07/22/2021     07/22/2021    CO2 26 07/22/2021    BUN 15 07/22/2021    CREATININE 0.80 07/22/2021    GLUCOSE 172 11/04/2021    CALCIUM 9.8 07/22/2021    LABGLOM >60.0 07/22/2021    GFRAA >60.0 07/22/2021     No results found for: WBC, HGB, HCT, MCV, PLT  Lab Results   Component Value Date    LABA1C 6.3 11/04/2021    LABA1C 8.0 (H) 07/22/2021    LABA1C 9.4 07/08/2021     Lab Results   Component Value Date    CHOLFAST 166 07/22/2021    TRIGLYCFAST 58 07/22/2021    HDL 55 07/22/2021    LDLCALC 99 07/22/2021     Lab Results   Component Value Date    TESTM 300 06/11/2021     Lab Results   Component Value Date    TSH 3.650 06/11/2021    T4FREE 1.08 06/11/2021     Lab Results   Component Value Date    TPOABS <4.0 06/11/2021       Review of Systems   Cardiovascular: Negative. Endocrine: Negative. All other systems reviewed and are negative. Objective:   Physical Exam  Vitals reviewed. Constitutional:       Appearance: Normal appearance. He is normal weight. HENT:      Head: Normocephalic and atraumatic. Hair is normal.      Right Ear: External ear normal.      Left Ear: External ear normal.      Nose: Nose normal.   Eyes:      General: No scleral icterus. Right eye: No discharge. Left eye: No discharge. Extraocular Movements: Extraocular movements intact. Conjunctiva/sclera: Conjunctivae normal.   Neck:      Trachea: Trachea normal.   Cardiovascular:      Rate and Rhythm: Normal rate. Pulmonary:      Effort: Pulmonary effort is normal.   Musculoskeletal:         General: Normal range of motion. Cervical back: Normal range of motion and neck supple. Neurological:      General: No focal deficit present. Mental Status: He is alert and oriented to person, place, and time.    Psychiatric:         Mood and Affect: Mood normal. Behavior: Behavior normal.

## 2021-11-11 DIAGNOSIS — E10.69 TYPE I DIABETES MELLITUS WITH HYPEROSMOLAR COMA (HCC): ICD-10-CM

## 2021-11-11 DIAGNOSIS — E10.65 TYPE I DIABETES MELLITUS WITH HYPEROSMOLAR COMA (HCC): ICD-10-CM

## 2021-11-11 DIAGNOSIS — E87.0 TYPE I DIABETES MELLITUS WITH HYPEROSMOLAR COMA (HCC): ICD-10-CM

## 2021-11-11 LAB
ANION GAP SERPL CALCULATED.3IONS-SCNC: 15 MEQ/L (ref 9–15)
BUN BLDV-MCNC: 18 MG/DL (ref 6–20)
CALCIUM SERPL-MCNC: 9.6 MG/DL (ref 8.5–9.9)
CHLORIDE BLD-SCNC: 102 MEQ/L (ref 95–107)
CO2: 27 MEQ/L (ref 20–31)
CREAT SERPL-MCNC: 0.72 MG/DL (ref 0.7–1.2)
GFR AFRICAN AMERICAN: >60
GFR NON-AFRICAN AMERICAN: >60
GLUCOSE FASTING: 73 MG/DL (ref 70–99)
HBA1C MFR BLD: 6.4 % (ref 4.8–5.9)
POTASSIUM SERPL-SCNC: 3.9 MEQ/L (ref 3.4–4.9)
SODIUM BLD-SCNC: 144 MEQ/L (ref 135–144)

## 2022-01-06 DIAGNOSIS — E10.69 TYPE I DIABETES MELLITUS WITH HYPEROSMOLAR COMA (HCC): Primary | ICD-10-CM

## 2022-01-06 DIAGNOSIS — E10.65 TYPE I DIABETES MELLITUS WITH HYPEROSMOLAR COMA (HCC): Primary | ICD-10-CM

## 2022-01-06 DIAGNOSIS — E87.0 TYPE I DIABETES MELLITUS WITH HYPEROSMOLAR COMA (HCC): Primary | ICD-10-CM

## 2022-01-06 RX ORDER — INSULIN ASPART 100 [IU]/ML
INJECTION, SOLUTION INTRAVENOUS; SUBCUTANEOUS
Qty: 10 PEN | Refills: 3 | Status: SHIPPED | OUTPATIENT
Start: 2022-01-06

## 2022-01-06 RX ORDER — INSULIN LISPRO 100 [IU]/ML
INJECTION, SOLUTION INTRAVENOUS; SUBCUTANEOUS
Qty: 10 PEN | Refills: 3 | Status: SHIPPED | OUTPATIENT
Start: 2022-01-06 | End: 2022-02-11

## 2022-01-14 ENCOUNTER — OFFICE VISIT (OUTPATIENT)
Dept: ENDOCRINOLOGY | Age: 37
End: 2022-01-14
Payer: OTHER GOVERNMENT

## 2022-01-14 VITALS
HEIGHT: 74 IN | SYSTOLIC BLOOD PRESSURE: 139 MMHG | DIASTOLIC BLOOD PRESSURE: 80 MMHG | HEART RATE: 81 BPM | OXYGEN SATURATION: 100 % | WEIGHT: 199 LBS | BODY MASS INDEX: 25.54 KG/M2

## 2022-01-14 PROCEDURE — 99213 OFFICE O/P EST LOW 20 MIN: CPT | Performed by: INTERNAL MEDICINE

## 2022-01-14 NOTE — PROGRESS NOTES
1/14/2022    Assessment:       Diagnosis Orders   1. DM w/ coma type I, uncontrolled ([address]           PLAN:     Continue patient on Basaglar 12 units in the morning 20 2 in the evening Humalog NovoLog 3 times a day discontinue Afrezza follow-up in 3 months    Subjective:     Chief Complaint   Patient presents with    Diabetes     Vitals:    01/14/22 1439   BP: 139/80   Pulse: 81   SpO2: 100%   Weight: 199 lb (90.3 kg)   Height: 6' 2\" (1.88 m)     Wt Readings from Last 3 Encounters:   01/14/22 199 lb (90.3 kg)   11/04/21 180 lb (81.6 kg)   09/24/21 176 lb (79.8 kg)     BP Readings from Last 3 Encounters:   01/14/22 139/80   11/04/21 134/71   09/24/21 120/80     Follow-up on type 1 diabetes patient Basaglar twice a day plus Jay Nelson is to change to Humalog because of his job A1c has been stable using Dexcom CGM for continuous glucose monitoring    Diabetes  He presents for his follow-up diabetic visit. He has type 1 diabetes mellitus. His disease course has been stable. Current diabetic treatment includes insulin injections. He is currently taking insulin pre-breakfast, pre-lunch, pre-dinner and at bedtime. His overall blood glucose range is 130-140 mg/dl. (Lab Results       Component                Value               Date                       LABA1C                   6.4 (H)             11/11/2021            )     Past Medical History:   Diagnosis Date    DM w/ coma type I, uncontrolled ([address] 6/11/2021     No past surgical history on file. Social History     Socioeconomic History    Marital status:      Spouse name: Not on file    Number of children: Not on file    Years of education: Not on file    Highest education level: Not on file   Occupational History    Not on file   Tobacco Use    Smoking status: Never Smoker    Smokeless tobacco: Never Used   Vaping Use    Vaping Use: Never used   Substance and Sexual Activity    Alcohol use:  Yes    Drug use: Never    Sexual activity: Yes   Other Topics Concern    Not on file   Social History Narrative    Not on file     Social Determinants of Health     Financial Resource Strain: Low Risk     Difficulty of Paying Living Expenses: Not hard at all   Food Insecurity: No Food Insecurity    Worried About Running Out of Food in the Last Year: Never true    [address] in the Last Year: Never true   Transportation Needs:     Lack of Transportation (Medical): Not on file    Lack of Transportation (Non-Medical): Not on file   Physical Activity:     Days of Exercise per Week: Not on file    Minutes of Exercise per Session: Not on file   Stress:     Feeling of Stress : Not on file   Social Connections:     Frequency of Communication with Friends and Family: Not on file    Frequency of Social Gatherings with Friends and Family: Not on file    Attends Zoroastrian Services: Not on file    Active Member of [address] [place] [address] or Organizations: Not on file    Attends Club or Organization Meetings: Not on file    Marital Status: Not on file   Intimate Partner Violence:     Fear of Current or Ex-Partner: Not on file    Emotionally Abused: Not on file    Physically Abused: Not on file    Sexually Abused: Not on file   Housing Stability:     Unable to Pay for Housing in the Last Year: Not on file    Number of [place] in the Last Year: Not on file    Unstable Housing in the Last Year: Not on file     No family history on file.   No Known Allergies    Current Outpatient Medications:     insulin lispro, 1 Unit Dial, (HUMALOG KWIKPEN) 100 UNIT/ML SOPN, 8 to 12 units with each meals, Disp: 10 pen, Rfl: 3    Insulin Pen Needle 32G X 4 MM MISC, qid, Disp: 200 each, Rfl: 3    insulin aspart (NOVOLOG FLEXPEN) 100 UNIT/ML injection pen, 8 to units with each meals, Disp: 10 pen, Rfl: 3    Insulin Pen Needle (RELION PEN NEEDLES) 31G X 6 MM MISC, Use 5 daily with insulin, Disp: 100 each, Rfl: 3    BASAGLAR KWIKPEN 100 UNIT/ML injection pen, Lot [id number] exp 8/21, Disp: 2 pen, Rfl: 0    AFREZZA 4 & 8 & 12 units POWD, Lot [id number] exp 9/21, Disp: 2 each, Rfl: 0    Insulin Regular Human 4 & 8 & 12 units POWD, Take 16 units  Am 8 units lunch and 16 units dinner, Disp: 60 each, Rfl: 3    blood glucose test strips (ACCU-CHEK GUIDE) strip, Pt test 4x daily Dx E10.65, Disp: 150 each, Rfl: 3    Glucagon (GVOKE HYPOPEN 2-PACK) 1 MG/0.2ML SOAJ, As needed, Disp: 2 Syringe, Rfl: 3    Continuous Blood Gluc Transmit (DEXCOM G6 TRANSMITTER) MISC, Change every 3 months, Disp: 1 each, Rfl: 3    Continuous Blood Gluc Sensor (DEXCOM G6 SENSOR) MISC, Change every 10 days, Disp: 1 each, Rfl: 11    Continuous Blood Gluc  (DEXCOM G6 ) CORY, Continuous, Disp: 1 Device, Rfl: 0  Lab Results   Component Value Date     11/11/2021    K 3.9 11/11/2021     11/11/2021    CO2 27 11/11/2021    BUN 18 11/11/2021    CREATININE 0.72 11/11/2021    GLUCOSE 172 11/04/2021    CALCIUM 9.6 11/11/2021    LABGLOM >60.0 11/11/2021    GFRAA >60.0 11/11/2021     No results found for: WBC, HGB, HCT, MCV, PLT  Lab Results   Component Value Date    LABA1C 6.4 (H) 11/11/2021    LABA1C 6.3 11/04/2021    LABA1C 8.0 (H) 07/22/2021     Lab Results   Component Value Date    CHOLFAST 166 07/22/2021    TRIGLYCFAST 58 07/22/2021    HDL 55 07/22/2021    LDLCALC 99 07/22/2021     Lab Results   Component Value Date    TESTM 300 06/11/2021     Lab Results   Component Value Date    TSH 3.650 06/11/2021    T4FREE 1.08 06/11/2021     Lab Results   Component Value Date    TPOABS <4.0 06/11/2021       Review of Systems   Cardiovascular: Negative. Endocrine: Negative. All other systems reviewed and are negative. Objective:   Physical Exam  Vitals reviewed. Constitutional:       Appearance: Normal appearance. HENT:      Head: Normocephalic and atraumatic. Hair is normal.      Right Ear: External ear normal.      Left Ear: External ear normal.      Nose: Nose normal.   Eyes:      General: No scleral icterus. Right eye: No discharge. Left eye: No discharge. Extraocular Movements: Extraocular movements intact. Conjunctiva/sclera: Conjunctivae normal.   Neck:      Trachea: Trachea normal.   Cardiovascular:      Rate and Rhythm: Normal rate. Pulmonary:      Effort: Pulmonary effort is normal.   Musculoskeletal:         General: Normal range of motion. Cervical back: Normal range of motion and neck supple. Neurological:      General: No focal deficit present. Mental Status: He is alert and oriented to person, place, and time.    Psychiatric:         Mood and Affect: Mood normal.         Behavior: Behavior normal.

## 2022-02-11 ENCOUNTER — OFFICE VISIT (OUTPATIENT)
Dept: ENDOCRINOLOGY | Age: 37
End: 2022-02-11
Payer: OTHER GOVERNMENT

## 2022-02-11 VITALS
SYSTOLIC BLOOD PRESSURE: 128 MMHG | HEIGHT: 74 IN | DIASTOLIC BLOOD PRESSURE: 83 MMHG | OXYGEN SATURATION: 98 % | WEIGHT: 203 LBS | BODY MASS INDEX: 26.05 KG/M2 | HEART RATE: 85 BPM

## 2022-02-11 DIAGNOSIS — E87.0 TYPE I DIABETES MELLITUS WITH HYPEROSMOLAR COMA (HCC): ICD-10-CM

## 2022-02-11 DIAGNOSIS — E10.69 TYPE I DIABETES MELLITUS WITH HYPEROSMOLAR COMA (HCC): ICD-10-CM

## 2022-02-11 DIAGNOSIS — E10.65 TYPE I DIABETES MELLITUS WITH HYPEROSMOLAR COMA (HCC): ICD-10-CM

## 2022-02-11 LAB
ANION GAP SERPL CALCULATED.3IONS-SCNC: 11 MEQ/L (ref 9–15)
BUN BLDV-MCNC: 17 MG/DL (ref 6–20)
CALCIUM SERPL-MCNC: 9.3 MG/DL (ref 8.5–9.9)
CHLORIDE BLD-SCNC: 101 MEQ/L (ref 95–107)
CHP ED QC CHECK: NORMAL
CO2: 28 MEQ/L (ref 20–31)
CREAT SERPL-MCNC: 0.83 MG/DL (ref 0.7–1.2)
GFR AFRICAN AMERICAN: >60
GFR NON-AFRICAN AMERICAN: >60
GLUCOSE BLD-MCNC: 121 MG/DL
GLUCOSE BLD-MCNC: 88 MG/DL (ref 70–99)
HBA1C MFR BLD: 6.2 % (ref 4.8–5.9)
POTASSIUM SERPL-SCNC: 3.9 MEQ/L (ref 3.4–4.9)
SODIUM BLD-SCNC: 140 MEQ/L (ref 135–144)

## 2022-02-11 PROCEDURE — 82962 GLUCOSE BLOOD TEST: CPT | Performed by: INTERNAL MEDICINE

## 2022-02-11 PROCEDURE — 99213 OFFICE O/P EST LOW 20 MIN: CPT | Performed by: INTERNAL MEDICINE

## 2022-02-11 ASSESSMENT — ENCOUNTER SYMPTOMS: EYES NEGATIVE: 1

## 2022-02-11 NOTE — PROGRESS NOTES
2/11/2022    Assessment:       Diagnosis Orders   1. DM w/ coma type I, uncontrolled ([facility] [address]  POCT Glucose         PLAN:     Orders Placed This Encounter   Procedures    Hemoglobin A1C     Standing Status:   Future     Standing Expiration Date:   2/11/2023    Basic Metabolic Panel, Fasting     Standing Status:   Future     Standing Expiration Date:   2/11/2023    Microalbumin / Creatinine Urine Ratio     Standing Status:   Future     Standing Expiration Date:   2/11/2023    POCT Glucose     Continue current insulin regimen patient to follow-up in 3 months time labs to be done prior to next visit    Subjective:     Chief Complaint   Patient presents with    Diabetes     Vitals:    02/11/22 0930   BP: 128/83   Pulse: 85   SpO2: 98%   Weight: 203 lb (92.1 kg)   Height: 6' 2\" (1.88 m)     Wt Readings from Last 3 Encounters:   02/11/22 203 lb (92.1 kg)   01/14/22 199 lb (90.3 kg)   11/04/21 180 lb (81.6 kg)     BP Readings from Last 3 Encounters:   02/11/22 128/83   01/14/22 139/80   11/04/21 134/71     3-month follow-up on type 1 diabetes patient is on insulin injections Basaglar plus NovoLog also using Dexcom continuous glucose monitoring those were downloaded reviewed with patient no severe hypoglycemia labs were done today A1c pending  Patient on Basaglar 12 units in the morning 22 units in the evening  Plus NovoLog 8 units with each meals    Diabetes  He presents for his follow-up diabetic visit. He has type 1 diabetes mellitus. Symptoms are stable. Current diabetic treatment includes insulin injections. He is currently taking insulin pre-breakfast, pre-lunch, pre-dinner and at bedtime. Meal planning includes carbohydrate counting. His overall blood glucose range is 130-140 mg/dl.  (Reviewed 2week CGM on  On Dexcom continuous glucose monitoring average blood sugar was 1 39  84% in range  14% high  1% very high  1% low)     Past Medical History:   Diagnosis Date    DM w/ coma type I, uncontrolled ([address] 6/11/2021 History reviewed. No pertinent surgical history. Social History     Socioeconomic History    Marital status:      Spouse name: Not on file    Number of children: Not on file    Years of education: Not on file    Highest education level: Not on file   Occupational History    Not on file   Tobacco Use    Smoking status: Never Smoker    Smokeless tobacco: Never Used   Vaping Use    Vaping Use: Never used   Substance and Sexual Activity    Alcohol use: Yes    Drug use: Never    Sexual activity: Yes   Other Topics Concern    Not on file   Social History Narrative    Not on file     Social Determinants of Health     Financial Resource Strain: Low Risk     Difficulty of Paying Living Expenses: Not hard at all   Food Insecurity: No Food Insecurity    Worried About Running Out of Food in the Last Year: Never true    [address] in the Last Year: Never true   Transportation Needs:     Lack of Transportation (Medical): Not on file    Lack of Transportation (Non-Medical): Not on file   Physical Activity:     Days of Exercise per Week: Not on file    Minutes of Exercise per Session: Not on file   Stress:     Feeling of Stress : Not on file   Social Connections:     Frequency of Communication with Friends and Family: Not on file    Frequency of Social Gatherings with Friends and Family: Not on file    Attends [employer] Services: Not on file    Active Member of [address] [place] [address] or Organizations: Not on file    Attends Club or Organization Meetings: Not on file    Marital Status: Not on file   Intimate Partner Violence:     Fear of Current or Ex-Partner: Not on file    Emotionally Abused: Not on file    Physically Abused: Not on file    Sexually Abused: Not on file   Housing Stability:     Unable to Pay for Housing in the Last Year: Not on file    Number of [place] in the Last Year: Not on file    Unstable Housing in the Last Year: Not on file     History reviewed.  No pertinent family history.   No Known Allergies    Current Outpatient Medications:     Insulin Pen Needle 32G X 4 MM MISC, qid, Disp: 200 each, Rfl: 3    insulin aspart (NOVOLOG FLEXPEN) 100 UNIT/ML injection pen, 8 to units with each meals, Disp: 10 pen, Rfl: 3    Insulin Pen Needle (RELION PEN NEEDLES) 31G X 6 MM MISC, Use 5 daily with insulin, Disp: 100 each, Rfl: 3    BASAGLAR KWIKPEN 100 UNIT/ML injection pen, Lot [id number] exp 8/21, Disp: 2 pen, Rfl: 0    AFREZZA 4 & 8 & 12 units POWD, Lot [id number] exp 9/21, Disp: 2 each, Rfl: 0    Insulin Regular Human 4 & 8 & 12 units POWD, Take 16 units  Am 8 units lunch and 16 units dinner, Disp: 60 each, Rfl: 3    blood glucose test strips (ACCU-CHEK GUIDE) strip, Pt test 4x daily Dx E10.65, Disp: 150 each, Rfl: 3    Glucagon (GVOKE HYPOPEN 2-PACK) 1 MG/0.2ML SOAJ, As needed, Disp: 2 Syringe, Rfl: 3    Continuous Blood Gluc Transmit (DEXCOM G6 TRANSMITTER) MISC, Change every 3 months, Disp: 1 each, Rfl: 3    Continuous Blood Gluc Sensor (DEXCOM G6 SENSOR) MISC, Change every 10 days, Disp: 1 each, Rfl: 11    Continuous Blood Gluc  (DEXCOM G6 ) CORY, Continuous, Disp: 1 Device, Rfl: 0  Lab Results   Component Value Date     11/11/2021    K 3.9 11/11/2021     11/11/2021    CO2 27 11/11/2021    BUN 18 11/11/2021    CREATININE 0.72 11/11/2021    GLUCOSE 121 02/11/2022    CALCIUM 9.6 11/11/2021    LABGLOM >60.0 11/11/2021    GFRAA >60.0 11/11/2021     No results found for: WBC, HGB, HCT, MCV, PLT  Lab Results   Component Value Date    LABA1C 6.4 (H) 11/11/2021    LABA1C 6.3 11/04/2021    LABA1C 8.0 (H) 07/22/2021     Lab Results   Component Value Date    CHOLFAST 166 07/22/2021    TRIGLYCFAST 58 07/22/2021    HDL 55 07/22/2021    LDLCALC 99 07/22/2021     Lab Results   Component Value Date    TESTM 300 06/11/2021     Lab Results   Component Value Date    TSH 3.650 06/11/2021    T4FREE 1.08 06/11/2021     Lab Results   Component Value Date    TPOABS <4.0 06/11/2021       Review of Systems   Eyes: Negative. Endocrine: Negative. All other systems reviewed and are negative. Objective:   Physical Exam  Vitals reviewed. Constitutional:       Appearance: Normal appearance. HENT:      Head: Normocephalic and atraumatic. Hair is normal.      Right Ear: External ear normal.      Left Ear: External ear normal.      Nose: Nose normal.   Eyes:      General: No scleral icterus. Right eye: No discharge. Left eye: No discharge. Extraocular Movements: Extraocular movements intact. Conjunctiva/sclera: Conjunctivae normal.   Neck:      Trachea: Trachea normal.   Cardiovascular:      Rate and Rhythm: Normal rate. Pulmonary:      Effort: Pulmonary effort is normal.   Musculoskeletal:         General: Normal range of motion. Cervical back: Normal range of motion and neck supple. Neurological:      General: No focal deficit present. Mental Status: He is alert and oriented to person, place, and time.    Psychiatric:         Mood and Affect: Mood normal.         Behavior: Behavior normal.

## 2022-05-02 DIAGNOSIS — E10.65 TYPE I DIABETES MELLITUS WITH HYPEROSMOLAR COMA (HCC): Primary | ICD-10-CM

## 2022-05-02 DIAGNOSIS — R63.4 WEIGHT LOSS: ICD-10-CM

## 2022-05-02 DIAGNOSIS — E10.69 TYPE I DIABETES MELLITUS WITH HYPEROSMOLAR COMA (HCC): Primary | ICD-10-CM

## 2022-05-02 DIAGNOSIS — E87.0 TYPE I DIABETES MELLITUS WITH HYPEROSMOLAR COMA (HCC): Primary | ICD-10-CM

## 2022-05-19 ENCOUNTER — OFFICE VISIT (OUTPATIENT)
Dept: ENDOCRINOLOGY | Age: 37
End: 2022-05-19
Payer: OTHER GOVERNMENT

## 2022-05-19 ENCOUNTER — OFFICE VISIT (OUTPATIENT)
Dept: CARDIOLOGY CLINIC | Age: 37
End: 2022-05-19
Payer: OTHER GOVERNMENT

## 2022-05-19 VITALS
HEIGHT: 74 IN | WEIGHT: 225 LBS | BODY MASS INDEX: 28.88 KG/M2 | RESPIRATION RATE: 16 BRPM | SYSTOLIC BLOOD PRESSURE: 122 MMHG | DIASTOLIC BLOOD PRESSURE: 78 MMHG | OXYGEN SATURATION: 99 % | HEART RATE: 90 BPM

## 2022-05-19 VITALS
BODY MASS INDEX: 28.75 KG/M2 | HEIGHT: 74 IN | SYSTOLIC BLOOD PRESSURE: 128 MMHG | DIASTOLIC BLOOD PRESSURE: 74 MMHG | OXYGEN SATURATION: 97 % | HEART RATE: 83 BPM | WEIGHT: 224 LBS

## 2022-05-19 DIAGNOSIS — Z02.1 PRE-EMPLOYMENT EXAMINATION: ICD-10-CM

## 2022-05-19 DIAGNOSIS — Z00.00 PHYSICAL EXAM: Primary | ICD-10-CM

## 2022-05-19 DIAGNOSIS — E10.9 CONTROLLED DIABETES MELLITUS TYPE 1 WITHOUT COMPLICATIONS (HCC): Primary | ICD-10-CM

## 2022-05-19 DIAGNOSIS — R53.83 FATIGUE, UNSPECIFIED TYPE: ICD-10-CM

## 2022-05-19 DIAGNOSIS — R63.4 WEIGHT LOSS: ICD-10-CM

## 2022-05-19 DIAGNOSIS — R53.83 FATIGUE, UNSPECIFIED TYPE: Primary | ICD-10-CM

## 2022-05-19 LAB
ALBUMIN SERPL-MCNC: 4.7 G/DL (ref 3.5–4.6)
ALP BLD-CCNC: 103 U/L (ref 35–104)
ALT SERPL-CCNC: 21 U/L (ref 0–41)
ANION GAP SERPL CALCULATED.3IONS-SCNC: 12 MEQ/L (ref 9–15)
AST SERPL-CCNC: 21 U/L (ref 0–40)
BILIRUB SERPL-MCNC: 0.4 MG/DL (ref 0.2–0.7)
BILIRUBIN DIRECT: <0.2 MG/DL (ref 0–0.4)
BILIRUBIN, INDIRECT: ABNORMAL MG/DL (ref 0–0.6)
BUN BLDV-MCNC: 16 MG/DL (ref 6–20)
CALCIUM SERPL-MCNC: 9.4 MG/DL (ref 8.5–9.9)
CHLORIDE BLD-SCNC: 105 MEQ/L (ref 95–107)
CHOLESTEROL, TOTAL: 194 MG/DL (ref 0–199)
CHP ED QC CHECK: NORMAL
CO2: 25 MEQ/L (ref 20–31)
CREAT SERPL-MCNC: 0.84 MG/DL (ref 0.7–1.2)
CREATININE URINE: 160.8 MG/DL
FOLATE: 13.1 NG/ML
GFR AFRICAN AMERICAN: >60
GFR NON-AFRICAN AMERICAN: >60
GLUCOSE BLD-MCNC: 143 MG/DL
GLUCOSE FASTING: 98 MG/DL (ref 70–99)
HBA1C MFR BLD: 7 % (ref 4.8–5.9)
HCT VFR BLD CALC: 43.7 % (ref 42–52)
HDLC SERPL-MCNC: 49 MG/DL (ref 40–59)
HEMOGLOBIN: 15 G/DL (ref 14–18)
LDL CHOLESTEROL CALCULATED: 127 MG/DL (ref 0–129)
MCH RBC QN AUTO: 27.4 PG (ref 27–31.3)
MCHC RBC AUTO-ENTMCNC: 34.3 % (ref 33–37)
MCV RBC AUTO: 80.1 FL (ref 80–100)
MICROALBUMIN UR-MCNC: <1.2 MG/DL
MICROALBUMIN/CREAT UR-RTO: NORMAL MG/G (ref 0–30)
PDW BLD-RTO: 13.5 % (ref 11.5–14.5)
PLATELET # BLD: 221 K/UL (ref 130–400)
POTASSIUM SERPL-SCNC: 4.2 MEQ/L (ref 3.4–4.9)
RBC # BLD: 5.46 M/UL (ref 4.7–6.1)
SODIUM BLD-SCNC: 142 MEQ/L (ref 135–144)
TOTAL PROTEIN: 7.1 G/DL (ref 6.3–8)
TRIGL SERPL-MCNC: 90 MG/DL (ref 0–150)
TSH SERPL DL<=0.05 MIU/L-ACNC: 1.82 UIU/ML (ref 0.44–3.86)
VITAMIN B-12: 402 PG/ML (ref 232–1245)
WBC # BLD: 5.3 K/UL (ref 4.8–10.8)

## 2022-05-19 PROCEDURE — 99204 OFFICE O/P NEW MOD 45 MIN: CPT | Performed by: INTERNAL MEDICINE

## 2022-05-19 PROCEDURE — 82962 GLUCOSE BLOOD TEST: CPT | Performed by: INTERNAL MEDICINE

## 2022-05-19 PROCEDURE — 3044F HG A1C LEVEL LT 7.0%: CPT | Performed by: INTERNAL MEDICINE

## 2022-05-19 PROCEDURE — 93000 ELECTROCARDIOGRAM COMPLETE: CPT | Performed by: INTERNAL MEDICINE

## 2022-05-19 PROCEDURE — 3051F HG A1C>EQUAL 7.0%<8.0%: CPT | Performed by: INTERNAL MEDICINE

## 2022-05-19 PROCEDURE — 99213 OFFICE O/P EST LOW 20 MIN: CPT | Performed by: INTERNAL MEDICINE

## 2022-05-19 ASSESSMENT — ENCOUNTER SYMPTOMS
SHORTNESS OF BREATH: 0
CHEST TIGHTNESS: 0
COUGH: 0
RESPIRATORY NEGATIVE: 1
STRIDOR: 0
NAUSEA: 0
EYES NEGATIVE: 1
GASTROINTESTINAL NEGATIVE: 1
EYES NEGATIVE: 1
BLOOD IN STOOL: 0
WHEEZING: 0

## 2022-05-19 NOTE — PROGRESS NOTES
NEW PATIENT        Patient: Timbo Leon  YOB: 1985  MRN: [id number]    Chief Complaint:  Chief Complaint   Patient presents with   Boni Harper Cardiologist    Cardiac Clearance     employment physical for [employer]       CV Data:    Subjective/HPI  [address] [place]. Pt is activ ehas no symptoms no cp exercises 4 days  A week with no issues. EKG: SR 80    +FH  Work - [employer]  Lives with wife  Nonsmoker  occ ETOH    Past Medical History:   Diagnosis Date    DM w/ coma type I, uncontrolled ([address] 6/11/2021       History reviewed. No pertinent surgical history. History reviewed. No pertinent family history. Social History     Socioeconomic History    Marital status:      Spouse name: None    Number of children: None    Years of education: None    Highest education level: None   Occupational History    None   Tobacco Use    Smoking status: Never Smoker    Smokeless tobacco: Never Used   Vaping Use    Vaping Use: Never used   Substance and Sexual Activity    Alcohol use: Yes    Drug use: Never    Sexual activity: Yes   Other Topics Concern    None   Social History Narrative    None     Social Determinants of Health     Financial Resource Strain: Low Risk     Difficulty of Paying Living Expenses: Not hard at all   Food Insecurity: No Food Insecurity    Worried About Running Out of Food in the Last Year: Never true    [address] in the Last Year: Never true   Transportation Needs:     Lack of Transportation (Medical): Not on file    Lack of Transportation (Non-Medical):  Not on file   Physical Activity:     Days of Exercise per Week: Not on file    Minutes of Exercise per Session: Not on file   Stress:     Feeling of Stress : Not on file   Social Connections:     Frequency of Communication with Friends and Family: Not on file    Frequency of Social Gatherings with Friends and Family: Not on file    Attends [employer]ational Services: Not on file   [place] Active Member of Clubs or Organizations: Not on file    Attends Club or Organization Meetings: Not on file    Marital Status: Not on file   Intimate Partner Violence:     Fear of Current or Ex-Partner: Not on file    Emotionally Abused: Not on file    Physically Abused: Not on file    Sexually Abused: Not on file   Housing Stability:     Unable to Pay for Housing in the Last Year: Not on file    Number of Places Lived in the Last Year: Not on file    Unstable Housing in the Last Year: Not on file       No Known Allergies    Current Outpatient Medications   Medication Sig Dispense Refill    Insulin Pen Needle 32G X 4 MM MISC qid 200 each 3    insulin aspart (NOVOLOG FLEXPEN) 100 UNIT/ML injection pen 8 to units with each meals 10 pen 3    Insulin Pen Needle (RELION PEN NEEDLES) 31G X 6 MM MISC Use 5 daily with insulin 100 each 3    BASAGLAR KWIKPEN 100 UNIT/ML injection pen Lot [id number] exp 8/21 2 pen 0    Insulin Regular Human 4 & 8 & 12 units POWD Take 16 units  Am 8 units lunch and 16 units dinner 60 each 3    blood glucose test strips (ACCU-CHEK GUIDE) strip Pt test 4x daily Dx E10.65 150 each 3    Glucagon (GVOKE HYPOPEN 2-PACK) 1 MG/0.2ML SOAJ As needed 2 Syringe 3    Continuous Blood Gluc Transmit (DEXCOM G6 TRANSMITTER) MISC Change every 3 months 1 each 3    Continuous Blood Gluc Sensor (DEXCOM G6 SENSOR) MISC Change every 10 days 1 each 11    Continuous Blood Gluc  (DEXCOM G6 ) CORY Continuous 1 Device 0     No current facility-administered medications for this visit. Review of Systems:   Review of Systems   Constitutional: Negative. Negative for diaphoresis and fatigue. HENT: Negative. Eyes: Negative. Respiratory: Negative. Negative for cough, chest tightness, shortness of breath, wheezing and stridor. Cardiovascular: Negative. Negative for chest pain, palpitations and leg swelling. Gastrointestinal: Negative. Negative for blood in stool and nausea. Genitourinary: Negative. Musculoskeletal: Negative. Skin: Negative. Neurological: Negative. Negative for dizziness, syncope, weakness and light-headedness. Hematological: Negative. Psychiatric/Behavioral: Negative. Physical Examination:    /78 (Site: Right Upper Arm, Position: Sitting, Cuff Size: Medium Adult)   Pulse 90   Resp 16   Ht 6' 2\" (1.88 m)   Wt 225 lb (102.1 kg)   SpO2 99%   BMI 28.89 kg/m²    Physical Exam   Constitutional: He appears healthy. No distress. HENT:   Normal cephalic and Atraumatic   Eyes: Pupils are equal, round, and reactive to light. Neck: Thyroid normal. No JVD present. No neck adenopathy. No thyromegaly present. Cardiovascular: Normal rate, regular rhythm, normal heart sounds, intact distal pulses and normal pulses. Pulmonary/Chest: Effort normal and breath sounds normal. He has no wheezes. He has no rales. He exhibits no tenderness. Abdominal: Soft. Bowel sounds are normal. There is no abdominal tenderness. Musculoskeletal:         General: No tenderness or edema. Normal range of motion. Cervical back: Normal range of motion and neck supple. Neurological: He is alert and oriented to person, place, and time. Skin: Skin is warm. No cyanosis. Nails show no clubbing.        LABS:  CBC:   Lab Results   Component Value Date    WBC 5.3 05/19/2022    RBC 5.46 05/19/2022    HGB 15.0 05/19/2022    HCT 43.7 05/19/2022    MCV 80.1 05/19/2022    MCH 27.4 05/19/2022    MCHC 34.3 05/19/2022    RDW 13.5 05/19/2022     05/19/2022     Lipids:  Lab Results   Component Value Date    CHOL 194 05/19/2022     Lab Results   Component Value Date    TRIG 90 05/19/2022     Lab Results   Component Value Date    HDL 49 05/19/2022    HDL 55 07/22/2021     Lab Results   Component Value Date    LDLCALC 127 05/19/2022    [address] 07/22/2021     No results found for: LABVLDL, VLDL  No results found for: CHOLHDLRATIO  CMP:    Lab Results   Component Value Date     05/19/2022    K 4.2 05/19/2022     05/19/2022    CO2 25 05/19/2022    BUN 16 05/19/2022    CREATININE 0.84 05/19/2022    GFRAA >60.0 05/19/2022    LABGLOM >60.0 05/19/2022    GLUCOSE 143 05/19/2022    PROT 7.1 05/19/2022    LABALBU 4.7 05/19/2022    CALCIUM 9.4 05/19/2022    BILITOT 0.4 05/19/2022    ALKPHOS 103 05/19/2022    AST 21 05/19/2022    ALT 21 05/19/2022     BMP:    Lab Results   Component Value Date     05/19/2022    K 4.2 05/19/2022     05/19/2022    CO2 25 05/19/2022    BUN 16 05/19/2022    LABALBU 4.7 05/19/2022    CREATININE 0.84 05/19/2022    CALCIUM 9.4 05/19/2022    GFRAA >60.0 05/19/2022    LABGLOM >60.0 05/19/2022    GLUCOSE 143 05/19/2022     Magnesium:  No results found for: MG  TSH:  Lab Results   Component Value Date    TSH 1.820 05/19/2022             Patient Active Problem List   Diagnosis    DM w/ coma type I, uncontrolled ([facility] [address]       There are no discontinued medications. Modified Medications    No medications on file       No orders of the defined types were placed in this encounter. Assessment/Plan:    1. Physical exam     - EKG 12 Lead    2. DM w/ coma type I, uncontrolled ([address]       3. Pre-employment examination     - CARDIAC STRESS TEST EXERCISE ONLY; Future  - Echo 2D w doppler w color complete; Future     RTO for annual exam.     Counseling:  Heart Healthy Lifestyle, Low Salt Diet, Take Precautions to Prevent Falls and Walk Daily    Return in about 1 year (around 5/19/2023).     Electronically signed by Adan Horne MD on 5/19/2022 at 3:21 PM

## 2022-05-19 NOTE — PROGRESS NOTES
5/19/2022    Assessment:       Diagnosis Orders   1. Controlled diabetes mellitus type 1 without complications ([facility])         No hypoglycemia or coma secondary to hypoglycemia      PLAN:     Continue Basaglar 12 units in the morning 20 2 in the evening  Continue NovoLog/Humalog 8-12 with each meals  Repeat labs  Orders Placed This Encounter   Procedures    Hemoglobin A1C     Standing Status:   Future     Standing Expiration Date:   5/19/2023    Basic Metabolic Panel, Fasting     Standing Status:   Future     Standing Expiration Date:   5/19/2023    POCT Glucose         Orders Placed This Encounter   Procedures    POCT Glucose     No orders of the defined types were placed in this encounter. No follow-ups on file. Subjective:     Chief Complaint   Patient presents with    Diabetes     Vitals:    05/19/22 1021   BP: 128/74   Pulse: 83   SpO2: 97%   Weight: 224 lb (101.6 kg)   Height: 6' 2\" (1.88 m)     Wt Readings from Last 3 Encounters:   05/19/22 224 lb (101.6 kg)   02/11/22 203 lb (92.1 kg)   01/14/22 199 lb (90.3 kg)     BP Readings from Last 3 Encounters:   05/19/22 128/74   02/11/22 128/83   01/14/22 139/80     Follow-up on type 1 diabetes patient is on insulin injections using Dexcom continuous glucose monitoring labs were done today results are pending overall A1c's have been below 7 denies any hypoglycemia  Reviewed Dexcom download for 2week blood sugar profile averages  Patient blood sugars have been well controlled normal regular diabetic,    Diabetes  He presents for his follow-up diabetic visit. He has type 1 diabetes mellitus. Pertinent negatives for diabetes include no polydipsia and no polyuria. There are no hypoglycemic complications. Symptoms are stable. Current diabetic treatment includes insulin injections. He is currently taking insulin pre-breakfast, pre-lunch, pre-dinner and at bedtime. Meal planning includes carbohydrate counting. His overall blood glucose range is 140-180 mg/dl. (Reviewed 2-week average on Dexcom CGM average blood sugar 1 65  61% in range 34% high 4% very high  Less than 1% hypoglycemia)     Past Medical History:   Diagnosis Date    DM w/ coma type I, uncontrolled ([facility] [address] 6/11/2021     History reviewed. No pertinent surgical history. Social History     Socioeconomic History    Marital status:      Spouse name: Not on file    Number of children: Not on file    Years of education: Not on file    Highest education level: Not on file   Occupational History    Not on file   Tobacco Use    Smoking status: Never Smoker    Smokeless tobacco: Never Used   Vaping Use    Vaping Use: Never used   Substance and Sexual Activity    Alcohol use: Yes    Drug use: Never    Sexual activity: Yes   Other Topics Concern    Not on file   Social History Narrative    Not on file     Social Determinants of Health     Financial Resource Strain: Low Risk     Difficulty of Paying Living Expenses: Not hard at all   Food Insecurity: No Food Insecurity    Worried About Running Out of Food in the Last Year: Never true    [address] in the Last Year: Never true   Transportation Needs:     Lack of Transportation (Medical): Not on file    Lack of Transportation (Non-Medical):  Not on file   Physical Activity:     Days of Exercise per Week: Not on file    Minutes of Exercise per Session: Not on file   Stress:     Feeling of Stress : Not on file   Social Connections:     Frequency of Communication with Friends and Family: Not on file    Frequency of Social Gatherings with Friends and Family: Not on file    Attends Caoda[employer] Services: Not on file    Active Member of Clubs or Organizations: Not on file    Attends Club or Organization Meetings: Not on file    Marital Status: Not on file   Intimate Partner Violence:     Fear of Current or Ex-Partner: Not on file    Emotionally Abused: Not on file    Physically Abused: Not on file    Sexually Abused: Not on file   Housing Stability:     Unable to Pay for Housing in the Last Year: Not on file    Number of Places Lived in the Last Year: Not on file    Unstable Housing in the Last Year: Not on file     History reviewed. No pertinent family history.   No Known Allergies    Current Outpatient Medications:     Insulin Pen Needle 32G X 4 MM MISC, qid, Disp: 200 each, Rfl: 3    insulin aspart (NOVOLOG FLEXPEN) 100 UNIT/ML injection pen, 8 to units with each meals, Disp: 10 pen, Rfl: 3    Insulin Pen Needle (RELION PEN NEEDLES) 31G X 6 MM MISC, Use 5 daily with insulin, Disp: 100 each, Rfl: 3    BASAGLAR KWIKPEN 100 UNIT/ML injection pen, Lot [id number] exp 8/21, Disp: 2 pen, Rfl: 0    Insulin Regular Human 4 & 8 & 12 units POWD, Take 16 units  Am 8 units lunch and 16 units dinner, Disp: 60 each, Rfl: 3    blood glucose test strips (ACCU-CHEK GUIDE) strip, Pt test 4x daily Dx E10.65, Disp: 150 each, Rfl: 3    Glucagon (GVOKE HYPOPEN 2-PACK) 1 MG/0.2ML SOAJ, As needed, Disp: 2 Syringe, Rfl: 3    Continuous Blood Gluc Transmit (DEXCOM G6 TRANSMITTER) MISC, Change every 3 months, Disp: 1 each, Rfl: 3    Continuous Blood Gluc Sensor (DEXCOM G6 SENSOR) MISC, Change every 10 days, Disp: 1 each, Rfl: 11    Continuous Blood Gluc  (DEXCOM G6 ) CORY, Continuous, Disp: 1 Device, Rfl: 0  Lab Results   Component Value Date     02/11/2022    K 3.9 02/11/2022     02/11/2022    CO2 28 02/11/2022    BUN 17 02/11/2022    CREATININE 0.83 02/11/2022    GLUCOSE 143 05/19/2022    CALCIUM 9.3 02/11/2022    LABGLOM >60.0 02/11/2022    GFRAA >60.0 02/11/2022     No results found for: WBC, HGB, HCT, MCV, PLT  Lab Results   Component Value Date    LABA1C 6.2 (H) 02/11/2022    LABA1C 6.4 (H) 11/11/2021    LABA1C 6.3 11/04/2021     Lab Results   Component Value Date    CHOLFAST 166 07/22/2021    TRIGLYCFAST 58 07/22/2021    HDL 55 07/22/2021    LDLCALC 99 07/22/2021     Lab Results   Component Value Date    TESTM 300 06/11/2021 Lab Results   Component Value Date    TSH 3.650 06/11/2021    T4FREE 1.08 06/11/2021     Lab Results   Component Value Date    TPOABS <4.0 06/11/2021       Review of Systems   Eyes: Negative. Cardiovascular: Negative. Endocrine: Negative for polydipsia and polyuria. All other systems reviewed and are negative. Objective:   Physical Exam  Vitals reviewed. Constitutional:       General: He is not in acute distress. Appearance: Normal appearance. HENT:      Head: Normocephalic and atraumatic. Right Ear: External ear normal.      Left Ear: External ear normal.      Nose: Nose normal.   Eyes:      General: No scleral icterus. Right eye: No discharge. Left eye: No discharge. Extraocular Movements: Extraocular movements intact. Conjunctiva/sclera: Conjunctivae normal.   Cardiovascular:      Rate and Rhythm: Normal rate. Pulmonary:      Effort: Pulmonary effort is normal.   Musculoskeletal:         General: Normal range of motion. Cervical back: Normal range of motion and neck supple. Neurological:      General: No focal deficit present. Mental Status: He is alert and oriented to person, place, and time.    Psychiatric:         Mood and Affect: Mood normal.         Behavior: Behavior normal.

## 2022-07-15 RX ORDER — INSULIN HUMAN 4-8-12(60)
KIT INHALATION
Qty: 360 EACH | Refills: 3 | Status: SHIPPED | OUTPATIENT
Start: 2022-07-15 | End: 2022-07-18 | Stop reason: SDUPTHER

## 2022-07-18 RX ORDER — INSULIN HUMAN 4-8-12(60)
KIT INHALATION
Qty: 360 EACH | Refills: 3 | Status: SHIPPED | OUTPATIENT
Start: 2022-07-18 | End: 2022-07-19 | Stop reason: SDUPTHER

## 2022-07-19 RX ORDER — INSULIN HUMAN 4-8-12(60)
KIT INHALATION
Qty: 360 EACH | Refills: 3 | Status: SHIPPED | OUTPATIENT
Start: 2022-07-19

## 2022-07-28 RX ORDER — INSULIN HUMAN 4-8-12(60)
KIT INHALATION
Qty: 2 EACH | Refills: 0 | COMMUNITY
Start: 2022-07-28

## 2022-07-29 ENCOUNTER — TELEPHONE (OUTPATIENT)
Dept: ENDOCRINOLOGY | Age: 37
End: 2022-07-29

## 2022-07-29 RX ORDER — INSULIN GLARGINE 100 [IU]/ML
INJECTION, SOLUTION SUBCUTANEOUS
Qty: 2 PEN | Refills: 0 | OUTPATIENT
Start: 2022-07-29

## 2022-07-29 RX ORDER — INSULIN GLARGINE 100 [IU]/ML
INJECTION, SOLUTION SUBCUTANEOUS
Qty: 15 ML | Refills: 0 | Status: SHIPPED | OUTPATIENT
Start: 2022-07-29 | End: 2022-09-02

## 2022-08-02 NOTE — TELEPHONE ENCOUNTER
Patient called and his insurance said to call [phone] to have the appeal expedited. Insurance company had me on hold for 15min, left a message to have them call me .

## 2022-08-26 ENCOUNTER — OFFICE VISIT (OUTPATIENT)
Dept: ENDOCRINOLOGY | Age: 37
End: 2022-08-26
Payer: OTHER GOVERNMENT

## 2022-08-26 VITALS
WEIGHT: 238 LBS | SYSTOLIC BLOOD PRESSURE: 137 MMHG | BODY MASS INDEX: 30.54 KG/M2 | HEIGHT: 74 IN | HEART RATE: 88 BPM | DIASTOLIC BLOOD PRESSURE: 84 MMHG | OXYGEN SATURATION: 97 %

## 2022-08-26 DIAGNOSIS — E10.9 CONTROLLED DIABETES MELLITUS TYPE 1 WITHOUT COMPLICATIONS (HCC): ICD-10-CM

## 2022-08-26 LAB
ANION GAP SERPL CALCULATED.3IONS-SCNC: 10 MEQ/L (ref 9–15)
BUN BLDV-MCNC: 19 MG/DL (ref 6–20)
CALCIUM SERPL-MCNC: 9.2 MG/DL (ref 8.5–9.9)
CHLORIDE BLD-SCNC: 104 MEQ/L (ref 95–107)
CHP ED QC CHECK: NORMAL
CO2: 27 MEQ/L (ref 20–31)
CREAT SERPL-MCNC: 0.98 MG/DL (ref 0.7–1.2)
GFR AFRICAN AMERICAN: >60
GFR NON-AFRICAN AMERICAN: >60
GLUCOSE BLD-MCNC: 135 MG/DL
GLUCOSE FASTING: 95 MG/DL (ref 70–99)
HBA1C MFR BLD: 6.9 % (ref 4.8–5.9)
POTASSIUM SERPL-SCNC: 3.7 MEQ/L (ref 3.4–4.9)
SODIUM BLD-SCNC: 141 MEQ/L (ref 135–144)

## 2022-08-26 PROCEDURE — 82962 GLUCOSE BLOOD TEST: CPT | Performed by: INTERNAL MEDICINE

## 2022-08-26 PROCEDURE — 99213 OFFICE O/P EST LOW 20 MIN: CPT | Performed by: INTERNAL MEDICINE

## 2022-08-26 PROCEDURE — 3044F HG A1C LEVEL LT 7.0%: CPT | Performed by: INTERNAL MEDICINE

## 2022-08-26 NOTE — PROGRESS NOTES
8/26/2022    Assessment:       Diagnosis Orders   1. DM w/ coma type I, uncontrolled ([address]  POCT Glucose            PLAN:     Orders Placed This Encounter   Procedures    Hemoglobin A1C     Standing Status:   Future     Standing Expiration Date:   9/27/1114    Basic Metabolic Panel, Fasting     Standing Status:   Future     Standing Expiration Date:   8/26/2023    POCT Glucose     DIABETES FOOT EXAM     Continue current dose of NovoLog Basaglar at current dose continue using Dexcom continuous glucose monitoring follow-up in 3 months    Orders Placed This Encounter   Procedures    POCT Glucose     No orders of the defined types were placed in this encounter. No follow-ups on file. Subjective:     Chief Complaint   Patient presents with    Diabetes     Vitals:    08/26/22 1011   BP: 137/84   Pulse: 88   SpO2: 97%   Weight: 238 lb (108 kg)   Height: 6' 2\" (1.88 m)     Wt Readings from Last 3 Encounters:   08/26/22 238 lb (108 kg)   05/19/22 225 lb (102.1 kg)   05/19/22 224 lb (101.6 kg)     BP Readings from Last 3 Encounters:   08/26/22 137/84   05/19/22 122/78   05/19/22 128/74     Follow-up on type 1 diabetes patient on Basaglar 12 units in the morning 22 units in the evening plus NovoLog Humalog 8 units with each meals hemoglobin A1c was 6.9 using Dexcom continuous glucose monitoring denies any hypoglycemia  Review 2-week download from Dexcom  Average blood sugar was 149  77% in range  22% high  1% very high  Less than 1% hypoglycemia    Diabetes  He presents for his follow-up diabetic visit. He has type 1 diabetes mellitus. Symptoms are stable. Current diabetic treatment includes insulin injections. He is currently taking insulin pre-breakfast, pre-lunch, pre-dinner and at bedtime. His overall blood glucose range is 140-180 mg/dl.  (Lab Results       Component                Value               Date                       LABA1C                   6.9 (H)             08/26/2022            )   Past Medical History:   Diagnosis Date    DM w/ coma type I, uncontrolled ([facility] [address] 6/11/2021     No past surgical history on file. Social History     Socioeconomic History    Marital status:      Spouse name: Not on file    Number of children: Not on file    Years of education: Not on file    Highest education level: Not on file   Occupational History    Not on file   Tobacco Use    Smoking status: Never    Smokeless tobacco: Never   Vaping Use    Vaping Use: Never used   Substance and Sexual Activity    Alcohol use: Yes    Drug use: Never    Sexual activity: Yes   Other Topics Concern    Not on file   Social History Narrative    Not on file     Social Determinants of Health     Financial Resource Strain: Low Risk     Difficulty of Paying Living Expenses: Not hard at all   Food Insecurity: No Food Insecurity    Worried About Running Out of Food in the Last Year: Never true    Ran Out of Food in the Last Year: Never true   Transportation Needs: Not on file   Physical Activity: Not on file   Stress: Not on file   Social Connections: Not on file   Intimate Partner Violence: Not on file   Housing Stability: Not on file     No family history on file.   No Known Allergies    Current Outpatient Medications:     BASAGLAR KWIKPEN 100 UNIT/ML injection pen, INJECT 12 UNITS SUBCUTANEOUSLY IN THE MORNING AND 22 UNITS IN THE EVENING, Disp: 15 mL, Rfl: 0    Insulin Regular Human (AFREZZA) 60x4 &60x8 & 60x12 UNIT POWD, Lot [id number] Exp 10/22 2 boxes were given, Disp: 2 each, Rfl: 0    Insulin Regular Human (AFREZZA) 60x4 &60x8 & 60x12 UNIT POWD, INHALE 8-24 UNITS BY MOUTH AT MEALTIME AND ADDITIONAL FOR GLUCOSE CONTROL (MAX DAILY DOSE 120 UNITS), Disp: 360 each, Rfl: 3    Insulin Regular Human 60x4 &60x8 & 60x12 UNIT POWD, Take 16 units  Am 8 units lunch and 16 units dinner, Disp: 90 each, Rfl: 5    Insulin Pen Needle 32G X 4 MM MISC, qid, Disp: 200 each, Rfl: 3    insulin aspart (NOVOLOG FLEXPEN) 100 UNIT/ML injection pen, 8 to units with each meals, Disp: 10 pen, Rfl: 3    Insulin Pen Needle (RELION PEN NEEDLES) 31G X 6 MM MISC, Use 5 daily with insulin, Disp: 100 each, Rfl: 3    Insulin Regular Human 4 & 8 & 12 units POWD, Take 16 units  Am 8 units lunch and 16 units dinner, Disp: 60 each, Rfl: 3    blood glucose test strips (ACCU-CHEK GUIDE) strip, Pt test 4x daily Dx E10.65, Disp: 150 each, Rfl: 3    Glucagon (GVOKE HYPOPEN 2-PACK) 1 MG/0.2ML SOAJ, As needed, Disp: 2 Syringe, Rfl: 3    Continuous Blood Gluc Transmit (DEXCOM G6 TRANSMITTER) MISC, Change every 3 months, Disp: 1 each, Rfl: 3    Continuous Blood Gluc Sensor (DEXCOM G6 SENSOR) MISC, Change every 10 days, Disp: 1 each, Rfl: 11    Continuous Blood Gluc  (DEXCOM G6 ) CORY, Continuous, Disp: 1 Device, Rfl: 0  Lab Results   Component Value Date     05/19/2022    K 4.2 05/19/2022     05/19/2022    CO2 25 05/19/2022    BUN 16 05/19/2022    CREATININE 0.84 05/19/2022    GLUCOSE 135 08/26/2022    CALCIUM 9.4 05/19/2022    PROT 7.1 05/19/2022    LABALBU 4.7 (H) 05/19/2022    BILITOT 0.4 05/19/2022    ALKPHOS 103 05/19/2022    AST 21 05/19/2022    ALT 21 05/19/2022    LABGLOM >60.0 05/19/2022    GFRAA >60.0 05/19/2022     Lab Results   Component Value Date    WBC 5.3 05/19/2022    HGB 15.0 05/19/2022    HCT 43.7 05/19/2022    MCV 80.1 05/19/2022     05/19/2022     Lab Results   Component Value Date    LABA1C 7.0 (H) 05/19/2022    LABA1C 6.2 (H) 02/11/2022    LABA1C 6.4 (H) 11/11/2021     Lab Results   Component Value Date    CHOLFAST 166 07/22/2021    TRIGLYCFAST 58 07/22/2021    HDL 49 05/19/2022    HDL 55 07/22/2021    LDLCALC 127 05/19/2022    LDLCALC 99 07/22/2021    CHOL 194 05/19/2022    TRIG 90 05/19/2022     Lab Results   Component Value Date    TESTM 300 06/11/2021     Lab Results   Component Value Date    TSH 1.820 05/19/2022    TSH 3.650 06/11/2021    T4FREE 1.08 06/11/2021     Lab Results   Component Value Date    TPOABS <4.0 06/11/2021       Review of Systems   Eyes: Negative. Cardiovascular: Negative. Endocrine: Negative. Neurological: Negative. All other systems reviewed and are negative. Objective:   Physical Exam  Vitals reviewed. Constitutional:       General: He is not in acute distress. Appearance: Normal appearance. He is normal weight. HENT:      Head: Normocephalic and atraumatic. Right Ear: External ear normal.      Left Ear: External ear normal.      Nose: Nose normal.   Eyes:      General: No scleral icterus. Right eye: No discharge. Left eye: No discharge. Extraocular Movements: Extraocular movements intact. Conjunctiva/sclera: Conjunctivae normal.   Cardiovascular:      Rate and Rhythm: Normal rate. Pulmonary:      Effort: Pulmonary effort is normal.   Musculoskeletal:         General: Normal range of motion. Cervical back: Normal range of motion and neck supple. Feet:    Neurological:      General: No focal deficit present. Mental Status: He is alert and oriented to person, place, and time.    Psychiatric:         Mood and Affect: Mood normal.         Behavior: Behavior normal.

## 2022-08-30 ASSESSMENT — ENCOUNTER SYMPTOMS: EYES NEGATIVE: 1

## 2022-09-02 RX ORDER — INSULIN GLARGINE 100 [IU]/ML
INJECTION, SOLUTION SUBCUTANEOUS
Qty: 15 ML | Refills: 3 | Status: SHIPPED | OUTPATIENT
Start: 2022-09-02

## 2022-10-18 ENCOUNTER — TELEPHONE (OUTPATIENT)
Dept: ENDOCRINOLOGY | Age: 37
End: 2022-10-18

## 2022-10-18 DIAGNOSIS — E10.65 TYPE I DIABETES MELLITUS WITH HYPEROSMOLAR COMA (HCC): Primary | ICD-10-CM

## 2022-10-18 DIAGNOSIS — E87.0 TYPE I DIABETES MELLITUS WITH HYPEROSMOLAR COMA (HCC): Primary | ICD-10-CM

## 2022-10-18 DIAGNOSIS — E10.69 TYPE I DIABETES MELLITUS WITH HYPEROSMOLAR COMA (HCC): Primary | ICD-10-CM

## 2022-10-18 NOTE — TELEPHONE ENCOUNTER
Pt needs a PFT for the afrezza, you had told him to see Dr Julian De Los Santos. Dr Julian De Los Santos needs a referral put in before they can schedule. Pt wanted to know since you give him the afrezza if you could just put the order in for the PFT.   If not pls put in referral

## 2022-10-27 ENCOUNTER — OFFICE VISIT (OUTPATIENT)
Dept: PULMONOLOGY | Age: 37
End: 2022-10-27
Payer: OTHER GOVERNMENT

## 2022-10-27 ENCOUNTER — HOSPITAL ENCOUNTER (OUTPATIENT)
Dept: PULMONOLOGY | Age: 37
Discharge: HOME | End: 2022-10-27
Payer: OTHER GOVERNMENT

## 2022-10-27 VITALS
WEIGHT: 246 LBS | SYSTOLIC BLOOD PRESSURE: 134 MMHG | DIASTOLIC BLOOD PRESSURE: 84 MMHG | OXYGEN SATURATION: 97 % | HEART RATE: 78 BPM | BODY MASS INDEX: 31.58 KG/M2

## 2022-10-27 DIAGNOSIS — E10.69 TYPE 1 DIABETES MELLITUS WITH HYPEROSMOLARITY WITHOUT COMA (HCC): ICD-10-CM

## 2022-10-27 DIAGNOSIS — E10.69 TYPE 1 DIABETES MELLITUS WITH HYPEROSMOLARITY WITHOUT COMA (HCC): Primary | ICD-10-CM

## 2022-10-27 DIAGNOSIS — E10.65 TYPE 1 DIABETES MELLITUS WITH HYPEROSMOLARITY WITHOUT COMA (HCC): Primary | ICD-10-CM

## 2022-10-27 DIAGNOSIS — E10.65 TYPE 1 DIABETES MELLITUS WITH HYPEROSMOLARITY WITHOUT COMA (HCC): ICD-10-CM

## 2022-10-27 DIAGNOSIS — E87.0 TYPE 1 DIABETES MELLITUS WITH HYPEROSMOLARITY WITHOUT COMA (HCC): ICD-10-CM

## 2022-10-27 DIAGNOSIS — E87.0 TYPE 1 DIABETES MELLITUS WITH HYPEROSMOLARITY WITHOUT COMA (HCC): Primary | ICD-10-CM

## 2022-10-27 PROCEDURE — 94729 DIFFUSING CAPACITY: CPT

## 2022-10-27 PROCEDURE — 94726 PLETHYSMOGRAPHY LUNG VOLUMES: CPT

## 2022-10-27 PROCEDURE — 94010 BREATHING CAPACITY TEST: CPT

## 2022-10-27 PROCEDURE — 99203 OFFICE O/P NEW LOW 30 MIN: CPT | Performed by: INTERNAL MEDICINE

## 2022-10-27 ASSESSMENT — ENCOUNTER SYMPTOMS
COUGH: 0
CHEST TIGHTNESS: 0
ABDOMINAL PAIN: 0
SHORTNESS OF BREATH: 0
EYE ITCHING: 0
VOMITING: 0
RHINORRHEA: 0
VOICE CHANGE: 0
DIARRHEA: 0
WHEEZING: 0
SORE THROAT: 0
NAUSEA: 0

## 2022-10-27 NOTE — PROGRESS NOTES
Subjective:     Ankush Harvey is a 29 y.o. male who complains today of:     Chief Complaint   Patient presents with    New Patient     Ref. By Marie Anderson - needs PFT for work     Sent here by Dr. Marie Anderson     HPI  He work as  in [place]. He needs PFT for job. He want to use nasal insulin spray. No C/o shortness of breath , worse with exertion. No Wheezing. No Cough. No Hemoptysis. No Chest tightness. No Chest pain with radiation  or pleuritic pain. No  leg edema. No orthopnea. No Fever or chills. No Rhinorrhea and postnasal drip. He never smoke    Allergies:  Patient has no known allergies. Past Medical History:   Diagnosis Date    DM w/ coma type I, uncontrolled ([facility] [address] 6/11/2021     No past surgical history on file. No family history on file. Social History     Socioeconomic History    Marital status:      Spouse name: Not on file    Number of children: Not on file    Years of education: Not on file    Highest education level: Not on file   Occupational History    Not on file   Tobacco Use    Smoking status: Never    Smokeless tobacco: Never   Vaping Use    Vaping Use: Never used   Substance and Sexual Activity    Alcohol use: Yes    Drug use: Never    Sexual activity: Yes   Other Topics Concern    Not on file   Social History Narrative    Not on file     Social Determinants of Health     Financial Resource Strain: Not on file   Food Insecurity: Not on file   Transportation Needs: Not on file   Physical Activity: Not on file   Stress: Not on file   Social Connections: Not on file   Intimate Partner Violence: Not on file   Housing Stability: Not on file         Review of Systems   Constitutional:  Negative for chills, diaphoresis, fatigue and fever. HENT:  Negative for congestion, mouth sores, nosebleeds, postnasal drip, rhinorrhea, sneezing, sore throat and voice change. Eyes:  Negative for itching and visual disturbance.    Respiratory:  Negative for cough, chest tightness, shortness of breath and wheezing. Cardiovascular: Negative. Negative for chest pain, palpitations and leg swelling. Gastrointestinal:  Negative for abdominal pain, diarrhea, nausea and vomiting. Genitourinary:  Negative for difficulty urinating and hematuria. Musculoskeletal:  Negative for arthralgias, joint swelling and myalgias. Skin:  Negative for rash. Allergic/Immunologic: Negative for environmental allergies. Neurological:  Negative for dizziness, tremors, weakness and headaches. Psychiatric/Behavioral:  Negative for behavioral problems and sleep disturbance.        :     Vitals:    10/27/22 1327 10/27/22 1329   BP: (!) 146/84 134/84   Site: Right Upper Arm Left Upper Arm   Position: Sitting Sitting   Cuff Size: Large Adult Large Adult   Pulse: 78    SpO2: 97%    Weight: 246 lb (111.6 kg)      Wt Readings from Last 3 Encounters:   10/27/22 246 lb (111.6 kg)   08/26/22 238 lb (108 kg)   05/19/22 225 lb (102.1 kg)         Physical Exam  Constitutional:       Appearance: He is well-developed. HENT:      Head: Normocephalic and atraumatic. Nose: Nose normal.   Eyes:      Conjunctiva/sclera: Conjunctivae normal.      Pupils: Pupils are equal, round, and reactive to light. Neck:      Thyroid: No thyromegaly. Vascular: No JVD. Trachea: No tracheal deviation. Cardiovascular:      Rate and Rhythm: Normal rate and regular rhythm. Heart sounds: No murmur heard. No friction rub. No gallop. Pulmonary:      Effort: Pulmonary effort is normal. No respiratory distress. Breath sounds: Normal breath sounds. No wheezing or rales. Chest:      Chest wall: No tenderness. Abdominal:      General: There is no distension. Musculoskeletal:         General: Normal range of motion. Lymphadenopathy:      Cervical: No cervical adenopathy. Skin:     General: Skin is warm and dry. Findings: No rash.    Neurological:      Mental Status: He is alert and oriented to person, place, and time. Cranial Nerves: No cranial nerve deficit. Psychiatric:         Behavior: Behavior normal.       Current Outpatient Medications   Medication Sig Dispense Refill    BASAGLAR KWIKPEN 100 UNIT/ML injection pen INJECT 12 UNITS SUBCUTANEOUSLY IN THE MORNING AND 22 IN THE EVENING 15 mL 3    Insulin Regular Human (AFREZZA) 60x4 &60x8 & 60x12 UNIT POWD Lot [id number] Exp 10/22 2 boxes were given 2 each 0    Insulin Regular Human (AFREZZA) 60x4 &60x8 & 60x12 UNIT POWD INHALE 8-24 UNITS BY MOUTH AT MEALTIME AND ADDITIONAL FOR GLUCOSE CONTROL (MAX DAILY DOSE 120 UNITS) 360 each 3    Insulin Regular Human 60x4 &60x8 & 60x12 UNIT POWD Take 16 units  Am 8 units lunch and 16 units dinner 90 each 5    Insulin Pen Needle 32G X 4 MM MISC qid 200 each 3    insulin aspart (NOVOLOG FLEXPEN) 100 UNIT/ML injection pen 8 to units with each meals 10 pen 3    Insulin Pen Needle (RELION PEN NEEDLES) 31G X 6 MM MISC Use 5 daily with insulin 100 each 3    Insulin Regular Human 4 & 8 & 12 units POWD Take 16 units  Am 8 units lunch and 16 units dinner 60 each 3    blood glucose test strips (ACCU-CHEK GUIDE) strip Pt test 4x daily Dx E10.65 150 each 3    Glucagon (GVOKE HYPOPEN 2-PACK) 1 MG/0.2ML SOAJ As needed 2 Syringe 3    Continuous Blood Gluc Transmit (DEXCOM G6 TRANSMITTER) MISC Change every 3 months 1 each 3    Continuous Blood Gluc Sensor (DEXCOM G6 SENSOR) MISC Change every 10 days 1 each 11    Continuous Blood Gluc  (DEXCOM G6 ) CORY Continuous 1 Device 0     No current facility-administered medications for this visit. Assessment/Plan:     1. Type 1 diabetes mellitus with hyperosmolarity without coma ([facility] [address]  He work as  in [place]. He needs PFT for job. He want to use nasal insulin spray. No C/o shortness of breath , worse with exertion. No Wheezing. No Cough. He never smoke. I called PFT lab and he will be scheduled to have PFT done today.       - Full PFT Study With Bronchodilator; Future    Patient advised to call regarding PFT result since he did not make any follow-up appointment at this time    No follow-ups on file.       Fe Wakefield MD

## 2022-11-02 PROCEDURE — 94010 BREATHING CAPACITY TEST: CPT | Performed by: INTERNAL MEDICINE

## 2022-11-02 PROCEDURE — 94726 PLETHYSMOGRAPHY LUNG VOLUMES: CPT | Performed by: INTERNAL MEDICINE

## 2022-11-02 PROCEDURE — 94729 DIFFUSING CAPACITY: CPT | Performed by: INTERNAL MEDICINE

## 2022-11-02 NOTE — PROCEDURES
[address]                      [address], [address] [facility]                               PULMONARY FUNCTION    PATIENT NAME: Bethany Thornton                  :        1985  MED REC NO:   [id number]                            ROOM:  ACCOUNT NO:   [de-identified]                           ADMIT DATE: 10/27/2022  PROVIDER:     Álvaro Westbrook MD    DATE OF PROCEDURE:  10/27/2022    REQUESTING PROVIDER:  Larey Krabbe. Abby Melara MD    INTERPRETING PROVIDER:  Larey Krabbe. Abby Melara MD    REASON FOR STUDY:  Type I diabetes, for medication use. INTERPRETATION:  FVC is 5.79, 101% of predicted. FEV1 is 4.64, 100% of  predicted. FEV1/FVC is 80%. FEF 25-75% is 4.47, 101% of predicted. Lung volume study shows residual volume is 3.26, 177% of predicted. TLC  is 8.96, 122% of predicted. RV to TLC ratio is 36, which is 146% of  predicted. Diffusion capacity is 36.11, 103% of predicted. Airway  resistance is 0.64, 44% of predicted. SUMMARY:  Normal spirometry. Static lung volume study suggests air  trapping and hyperinflation. Diffusion capacity is within normal range. Airway resistance is low. Clinical correlation is requested.         Donny Stevens MD    D: 2022 21:12:42       T: 2022 5:42:08     [email or web address]  Job#: [id number]     Doc#: [id number]    CC:

## 2022-11-17 ENCOUNTER — HOSPITAL ENCOUNTER (EMERGENCY)
Age: 37
Discharge: HOME | End: 2022-11-17
Payer: OTHER GOVERNMENT

## 2022-11-17 ENCOUNTER — APPOINTMENT (OUTPATIENT)
Dept: GENERAL RADIOLOGY | Age: 37
End: 2022-11-17
Payer: OTHER GOVERNMENT

## 2022-11-17 VITALS
WEIGHT: 240 LBS | DIASTOLIC BLOOD PRESSURE: 73 MMHG | RESPIRATION RATE: 16 BRPM | OXYGEN SATURATION: 96 % | SYSTOLIC BLOOD PRESSURE: 128 MMHG | HEART RATE: 99 BPM | TEMPERATURE: 98.1 F | BODY MASS INDEX: 30.81 KG/M2

## 2022-11-17 DIAGNOSIS — R11.0 NAUSEA: ICD-10-CM

## 2022-11-17 DIAGNOSIS — R73.9 HYPERGLYCEMIA: ICD-10-CM

## 2022-11-17 DIAGNOSIS — K29.00 ACUTE GASTRITIS, PRESENCE OF BLEEDING UNSPECIFIED, UNSPECIFIED GASTRITIS TYPE: ICD-10-CM

## 2022-11-17 DIAGNOSIS — J18.9 PNEUMONIA OF LEFT LOWER LOBE DUE TO INFECTIOUS ORGANISM: Primary | ICD-10-CM

## 2022-11-17 LAB
ALBUMIN SERPL-MCNC: 4.4 G/DL (ref 3.5–4.6)
ALP BLD-CCNC: 86 U/L (ref 35–104)
ALT SERPL-CCNC: 17 U/L (ref 0–41)
ANION GAP SERPL CALCULATED.3IONS-SCNC: 23 MEQ/L (ref 9–15)
AST SERPL-CCNC: 22 U/L (ref 0–40)
ATYPICAL LYMPHOCYTE RELATIVE PERCENT: 3 %
BACTERIA: NEGATIVE /HPF
BANDED NEUTROPHILS RELATIVE PERCENT: 6 %
BASE EXCESS VENOUS: -1 (ref -3–3)
BASOPHILS ABSOLUTE: 0 K/UL (ref 0–0.2)
BASOPHILS RELATIVE PERCENT: 0.3 %
BETA-HYDROXYBUTYRATE: 21.1 MG/DL (ref 0.2–2.8)
BILIRUB SERPL-MCNC: 1.4 MG/DL (ref 0.2–0.7)
BILIRUBIN URINE: NEGATIVE
BLOOD, URINE: ABNORMAL
BUN BLDV-MCNC: 19 MG/DL (ref 6–20)
CALCIUM IONIZED: 0.97 MMOL/L (ref 1.12–1.32)
CALCIUM SERPL-MCNC: 9 MG/DL (ref 8.5–9.9)
CHLORIDE BLD-SCNC: 87 MEQ/L (ref 95–107)
CHP ED QC CHECK: YES
CLARITY: CLEAR
CO2: 23 MEQ/L (ref 20–31)
COLOR: ABNORMAL
CREAT SERPL-MCNC: 0.88 MG/DL (ref 0.7–1.2)
EOSINOPHILS ABSOLUTE: 0 K/UL (ref 0–0.7)
EOSINOPHILS RELATIVE PERCENT: 0 %
EPITHELIAL CELLS, UA: NORMAL /HPF (ref 0–5)
GFR SERPL CREATININE-BSD FRML MDRD: >60 ML/MIN/{1.73_M2}
GFR SERPL CREATININE-BSD FRML MDRD: >60 ML/MIN/{1.73_M2}
GLOBULIN: 3.4 G/DL (ref 2.3–3.5)
GLUCOSE BLD-MCNC: 250 MG/DL
GLUCOSE BLD-MCNC: 250 MG/DL (ref 70–99)
GLUCOSE BLD-MCNC: 314 MG/DL (ref 70–99)
GLUCOSE BLD-MCNC: 338 MG/DL (ref 70–99)
GLUCOSE URINE: >=1000 MG/DL
HCO3 VENOUS: 22.6 MMOL/L (ref 23–29)
HCT VFR BLD CALC: 44.1 % (ref 42–52)
HEMOGLOBIN: 14.8 G/DL (ref 14–18)
HEMOGLOBIN: 19 GM/DL (ref 13.5–17.5)
HYALINE CASTS: NORMAL /HPF (ref 0–5)
INFLUENZA A BY PCR: NEGATIVE
INFLUENZA B BY PCR: NEGATIVE
KETONES, URINE: >=80 MG/DL
LACTATE: 2.33 MMOL/L (ref 0.4–2)
LACTIC ACID: 1.7 MMOL/L (ref 0.5–2.2)
LEUKOCYTE ESTERASE, URINE: NEGATIVE
LIPASE: 9 U/L (ref 12–95)
LYMPHOCYTES ABSOLUTE: 0.7 K/UL (ref 1–4.8)
LYMPHOCYTES RELATIVE PERCENT: 3 %
MCH RBC QN AUTO: 27.7 PG (ref 27–31.3)
MCHC RBC AUTO-ENTMCNC: 33.5 % (ref 33–37)
MCV RBC AUTO: 82.8 FL (ref 79–92.2)
MONOCYTES ABSOLUTE: 1.7 K/UL (ref 0.2–0.8)
MONOCYTES RELATIVE PERCENT: 6.5 %
NEUTROPHILS ABSOLUTE: 22.7 K/UL (ref 1.4–6.5)
NEUTROPHILS RELATIVE PERCENT: 85 %
NITRITE, URINE: NEGATIVE
O2 SAT, VEN: 88 %
PCO2, VEN: 32.8 MM HG (ref 40–50)
PDW BLD-RTO: 13.8 % (ref 11.5–14.5)
PERFORMED ON: ABNORMAL
PERFORMED ON: ABNORMAL
PH UA: 5.5 (ref 5–9)
PH VENOUS: 7.45 (ref 7.32–7.42)
PLATELET # BLD: 197 K/UL (ref 130–400)
PLATELET SLIDE REVIEW: NORMAL
PO2, VEN: 51 MM HG
POC CHLORIDE: 98 MEQ/L (ref 99–110)
POC CREATININE: 0.8 MG/DL (ref 0.8–1.3)
POC HEMATOCRIT: 56 % (ref 41–53)
POC POTASSIUM: 3.9 MEQ/L (ref 3.5–5.1)
POC SAMPLE TYPE: ABNORMAL
POC SODIUM: 132 MEQ/L (ref 136–145)
POTASSIUM SERPL-SCNC: 4 MEQ/L (ref 3.4–4.9)
PROCALCITONIN: 1.46 NG/ML (ref 0–0.15)
PROTEIN UA: 30 MG/DL
RBC # BLD: 5.33 M/UL (ref 4.7–6.1)
RBC # BLD: NORMAL 10*6/UL
RBC UA: NORMAL /HPF (ref 0–5)
SARS-COV-2, NAAT: NOT DETECTED
SODIUM BLD-SCNC: 133 MEQ/L (ref 135–144)
SPECIFIC GRAVITY UA: 1.04 (ref 1–1.03)
TCO2 CALC VENOUS: 24 MMOL/L
TOTAL PROTEIN: 7.8 G/DL (ref 6.3–8)
UROBILINOGEN, URINE: 0.2 E.U./DL
WBC # BLD: 24.9 K/UL (ref 4.8–10.8)
WBC UA: NORMAL /HPF (ref 0–5)

## 2022-11-17 PROCEDURE — 36600 WITHDRAWAL OF ARTERIAL BLOOD: CPT

## 2022-11-17 PROCEDURE — 82330 ASSAY OF CALCIUM: CPT

## 2022-11-17 PROCEDURE — 84145 PROCALCITONIN (PCT): CPT

## 2022-11-17 PROCEDURE — 82435 ASSAY OF BLOOD CHLORIDE: CPT

## 2022-11-17 PROCEDURE — 6360000002 HC RX W HCPCS

## 2022-11-17 PROCEDURE — 96361 HYDRATE IV INFUSION ADD-ON: CPT

## 2022-11-17 PROCEDURE — 82948 REAGENT STRIP/BLOOD GLUCOSE: CPT

## 2022-11-17 PROCEDURE — 6370000000 HC RX 637 (ALT 250 FOR IP): Performed by: PHYSICIAN ASSISTANT

## 2022-11-17 PROCEDURE — 82010 KETONE BODYS QUAN: CPT

## 2022-11-17 PROCEDURE — 96372 THER/PROPH/DIAG INJ SC/IM: CPT

## 2022-11-17 PROCEDURE — 6360000002 HC RX W HCPCS: Performed by: PHYSICIAN ASSISTANT

## 2022-11-17 PROCEDURE — 96374 THER/PROPH/DIAG INJ IV PUSH: CPT

## 2022-11-17 PROCEDURE — 71045 X-RAY EXAM CHEST 1 VIEW: CPT

## 2022-11-17 PROCEDURE — 84132 ASSAY OF SERUM POTASSIUM: CPT

## 2022-11-17 PROCEDURE — 96376 TX/PRO/DX INJ SAME DRUG ADON: CPT

## 2022-11-17 PROCEDURE — 80053 COMPREHEN METABOLIC PANEL: CPT

## 2022-11-17 PROCEDURE — 87502 INFLUENZA DNA AMP PROBE: CPT

## 2022-11-17 PROCEDURE — 2580000003 HC RX 258: Performed by: PHYSICIAN ASSISTANT

## 2022-11-17 PROCEDURE — 84295 ASSAY OF SERUM SODIUM: CPT

## 2022-11-17 PROCEDURE — 85014 HEMATOCRIT: CPT

## 2022-11-17 PROCEDURE — 82565 ASSAY OF CREATININE: CPT

## 2022-11-17 PROCEDURE — 36415 COLL VENOUS BLD VENIPUNCTURE: CPT

## 2022-11-17 PROCEDURE — 83690 ASSAY OF LIPASE: CPT

## 2022-11-17 PROCEDURE — 99284 EMERGENCY DEPT VISIT MOD MDM: CPT

## 2022-11-17 PROCEDURE — 82803 BLOOD GASES ANY COMBINATION: CPT

## 2022-11-17 PROCEDURE — 81001 URINALYSIS AUTO W/SCOPE: CPT

## 2022-11-17 PROCEDURE — 87635 SARS-COV-2 COVID-19 AMP PRB: CPT

## 2022-11-17 PROCEDURE — 85025 COMPLETE CBC W/AUTO DIFF WBC: CPT

## 2022-11-17 PROCEDURE — 83605 ASSAY OF LACTIC ACID: CPT

## 2022-11-17 RX ORDER — ONDANSETRON 2 MG/ML
4 INJECTION INTRAMUSCULAR; INTRAVENOUS ONCE
Status: COMPLETED | OUTPATIENT
Start: 2022-11-17 | End: 2022-11-17

## 2022-11-17 RX ORDER — ONDANSETRON 2 MG/ML
INJECTION INTRAMUSCULAR; INTRAVENOUS
Status: COMPLETED
Start: 2022-11-17 | End: 2022-11-17

## 2022-11-17 RX ORDER — ONDANSETRON 4 MG/1
4 TABLET, ORALLY DISINTEGRATING ORAL EVERY 8 HOURS PRN
Qty: 20 TABLET | Refills: 0 | Status: SHIPPED | OUTPATIENT
Start: 2022-11-17

## 2022-11-17 RX ORDER — ONDANSETRON 2 MG/ML
4 INJECTION INTRAMUSCULAR; INTRAVENOUS ONCE
Status: DISCONTINUED | OUTPATIENT
Start: 2022-11-17 | End: 2022-11-17 | Stop reason: HOSPADM

## 2022-11-17 RX ORDER — 0.9 % SODIUM CHLORIDE 0.9 %
1000 INTRAVENOUS SOLUTION INTRAVENOUS ONCE
Status: COMPLETED | OUTPATIENT
Start: 2022-11-17 | End: 2022-11-17

## 2022-11-17 RX ORDER — AZITHROMYCIN 250 MG/1
TABLET, FILM COATED ORAL
Qty: 1 PACKET | Refills: 0 | Status: SHIPPED | OUTPATIENT
Start: 2022-11-17 | End: 2022-11-27

## 2022-11-17 RX ORDER — ESOMEPRAZOLE MAGNESIUM 40 MG/1
40 CAPSULE, DELAYED RELEASE ORAL
Qty: 30 CAPSULE | Refills: 0 | Status: SHIPPED | OUTPATIENT
Start: 2022-11-17

## 2022-11-17 RX ADMIN — ONDANSETRON 4 MG: 2 INJECTION INTRAMUSCULAR; INTRAVENOUS at 16:16

## 2022-11-17 RX ADMIN — ONDANSETRON 4 MG: 2 INJECTION INTRAMUSCULAR; INTRAVENOUS at 13:23

## 2022-11-17 RX ADMIN — INSULIN HUMAN 6 UNITS: 100 INJECTION, SOLUTION PARENTERAL at 14:36

## 2022-11-17 RX ADMIN — SODIUM CHLORIDE 1000 ML: 9 INJECTION, SOLUTION INTRAVENOUS at 14:34

## 2022-11-17 RX ADMIN — SODIUM CHLORIDE 1000 ML: 9 INJECTION, SOLUTION INTRAVENOUS at 13:35

## 2022-11-17 ASSESSMENT — ENCOUNTER SYMPTOMS
ABDOMINAL PAIN: 0
NAUSEA: 1
VOMITING: 1
COLOR CHANGE: 0
SHORTNESS OF BREATH: 0
ABDOMINAL DISTENTION: 0
SORE THROAT: 0
EYE DISCHARGE: 0
CONSTIPATION: 0
RHINORRHEA: 0

## 2022-11-17 NOTE — ED NOTES
Pt vomited eliecer [place]. Muna Suarez, RN  11/17/22 [address]  Muna Suarez RN  11/17/22 [id number]

## 2022-11-17 NOTE — ED TRIAGE NOTES
Pt a/o x 3 skin pink w/d resp non labored. Pt c/o n,v since tueday after cataract surgery. Pt reports it may be from anesthesia. Pt unable to hold down food.  Pt DM

## 2022-11-17 NOTE — ED PROVIDER NOTES
[address] [facility] ED  eMERGENCY dEPARTMENT eNCOUnter      Pt Name: Alonzo Honeycutt  MRN: [id number]  [place] 1985  Date of evaluation: 11/17/2022  Provider: Ant Uriarte PA-C    CHIEF COMPLAINT       Chief Complaint   Patient presents with    Nausea & Vomiting     Started Tuesday after having cataract surgery. HISTORY OF PRESENT ILLNESS   (Location/Symptom, Timing/Onset,Context/Setting, Quality, Duration, Modifying Factors, Severity)  Note limiting factors. Alonzo Honeycutt is a 29 y.o. male who presents to the emergency department complaint of nausea and vomiting which patient states been ongoing since 11/15/2022 after having cataract surgery. Patient denies any headaches, no blurred vision, no chest pain or shortness of breath, no weakness, no body aches, no fevers or chills, he denies any recent ill contacts. States he has been vaccinated for COVID-19. Past medical history significant for diabetes, which he does control with insulin. HPI    NursingNotes were reviewed. REVIEW OF SYSTEMS    (2-9 systems for level 4, 10 or more for level 5)     Review of Systems   Constitutional:  Negative for activity change, appetite change, chills, fatigue and fever. HENT:  Negative for congestion, ear discharge, ear pain, nosebleeds, rhinorrhea and sore throat. Eyes:  Negative for discharge. Respiratory:  Negative for shortness of breath. Cardiovascular:  Negative for chest pain, palpitations and leg swelling. Gastrointestinal:  Positive for nausea and vomiting. Negative for abdominal distention, abdominal pain and constipation. Genitourinary:  Negative for difficulty urinating and dysuria. Musculoskeletal:  Negative for arthralgias and myalgias. Skin:  Negative for color change. Neurological:  Negative for dizziness, tremors, syncope, weakness, numbness and headaches. Psychiatric/Behavioral:  Negative for agitation and confusion.       Except as noted above the remainder of the review of systems was reviewed and negative. PAST MEDICAL HISTORY     Past Medical History:   Diagnosis Date    DM w/ coma type I, uncontrolled ([address] 6/11/2021         SURGICALHISTORY     No past surgical history on file. CURRENT MEDICATIONS       Previous Medications    BASAGLAR KWIKPEN 100 UNIT/ML INJECTION PEN    INJECT 12 UNITS SUBCUTANEOUSLY IN THE MORNING AND 22 IN THE EVENING    BLOOD GLUCOSE TEST STRIPS (ACCU-CHEK GUIDE) STRIP    Pt test 4x daily Dx E10.65    CONTINUOUS BLOOD GLUC  (DEXCOM G6 ) CORY    Continuous    CONTINUOUS BLOOD GLUC SENSOR (DEXCOM G6 SENSOR) MISC    Change every 10 days    CONTINUOUS BLOOD GLUC TRANSMIT (DEXCOM G6 TRANSMITTER) MISC    Change every 3 months    GLUCAGON (GVOKE HYPOPEN 2-PACK) 1 MG/0.2ML SOAJ    As needed    INSULIN ASPART (NOVOLOG FLEXPEN) 100 UNIT/ML INJECTION PEN    8 to units with each meals    INSULIN PEN NEEDLE (RELION PEN NEEDLES) 31G X 6 MM MISC    Use 5 daily with insulin    INSULIN PEN NEEDLE 32G X 4 MM MISC    qid    INSULIN REGULAR HUMAN (AFREZZA) 60X4 &60X8 & 60X12 UNIT POWD    INHALE 8-24 UNITS BY MOUTH AT MEALTIME AND ADDITIONAL FOR GLUCOSE CONTROL (MAX DAILY DOSE 120 UNITS)    INSULIN REGULAR HUMAN [facility]) 60X4 &60X8 & 60X12 UNIT POWD    Lot [id number] Exp 10/22 2 boxes were given    INSULIN REGULAR HUMAN 4 & 8 & 12 UNITS POWD    Take 16 units  Am 8 units lunch and 16 units dinner    INSULIN REGULAR HUMAN 60X4 &60X8 & 60X12 UNIT POWD    Take 16 units  Am 8 units lunch and 16 units dinner       ALLERGIES     Patient has no known allergies. FAMILY HISTORY     No family history on file.        SOCIAL HISTORY       Social History     Socioeconomic History    Marital status:    Tobacco Use    Smoking status: Never    Smokeless tobacco: Never   Vaping Use    Vaping Use: Never used   Substance and Sexual Activity    Alcohol use: Yes    Drug use: Never    Sexual activity: Yes       SCREENINGS    [place] Coma Scale  Eye Opening: Spontaneous  Best Verbal Response: Oriented  Best Motor Response: Obeys commands  Mark Coma Scale Score: 15 @FLOW([id number])@      PHYSICAL EXAM    (up to 7 for level 4, 8 or more for level 5)     ED Triage Vitals   BP Temp Temp Source Heart Rate Resp SpO2 Height Weight   11/17/22 1204 11/17/22 1203 11/17/22 1203 11/17/22 1203 11/17/22 1203 11/17/22 1203 -- 11/17/22 1203   (!) 144/85 98.1 °F (36.7 °C) Temporal (!) 110 18 96 %  240 lb (108.9 kg)       Physical Exam  Vitals and nursing note reviewed. Constitutional:       General: He is not in acute distress. Appearance: He is well-developed. He is not ill-appearing, toxic-appearing or diaphoretic. HENT:      Head: Normocephalic. Nose: Nose normal. No congestion. Mouth/Throat:      Mouth: Mucous membranes are moist.      Pharynx: No oropharyngeal exudate or posterior oropharyngeal erythema. Eyes:      Extraocular Movements: Extraocular movements intact. Conjunctiva/sclera: Conjunctivae normal.      Pupils: Pupils are equal, round, and reactive to light. Neck:      Vascular: No JVD. Trachea: No tracheal deviation. Cardiovascular:      Rate and Rhythm: Tachycardia present. Pulses: Normal pulses. Heart sounds: Normal heart sounds. No murmur heard. No friction rub. No gallop. Pulmonary:      Effort: Pulmonary effort is normal. No tachypnea, accessory muscle usage, respiratory distress or retractions. Breath sounds: Normal breath sounds. No stridor. No wheezing, rhonchi or rales. Comments: Lung sounds are clear in all fields, there is no wheezes rales or rhonchi, no excess muscle use, no retractions, room air saturations are 96%  Chest:      Chest wall: No tenderness. Abdominal:      General: Abdomen is flat. Bowel sounds are normal. There is no distension or abdominal bruit. Palpations: There is no shifting dullness, fluid wave, hepatomegaly, splenomegaly, mass or pulsatile mass. Tenderness: There is no abdominal tenderness. There is no right CVA tenderness, left CVA tenderness, guarding or rebound. Negative signs include Lui's sign, Rovsing's sign and McBurney's sign. Musculoskeletal:         General: No deformity. Cervical back: Normal range of motion and neck supple. No rigidity. Skin:     General: Skin is warm and dry. Capillary Refill: Capillary refill takes less than 2 seconds. Coloration: Skin is not jaundiced. Neurological:      General: No focal deficit present. Mental Status: He is alert and oriented to person, place, and time. Mental status is at baseline. Cranial Nerves: No cranial nerve deficit. Sensory: No sensory deficit. Motor: No weakness.       Coordination: Coordination normal.   Psychiatric:         Mood and Affect: Mood normal.       DIAGNOSTIC RESULTS     EKG: All EKG's are interpreted by the Emergency Department Physician who either signs or Co-signsthis chart in the absence of a cardiologist.        RADIOLOGY:   [place] such as CT, Ultrasound and MRI are read by the radiologist. Plain radiographic images are visualized and preliminarily interpreted by the emergency physician with the below findings:        Interpretation per the Radiologist below, if available at the time ofthis note:    XR CHEST PORTABLE   Final Result   Area of atelectasis versus patchy ground-glass infiltrate left lower lobe               ED BEDSIDE ULTRASOUND:   Performed by ED Physician - none    LABS:  Labs Reviewed   CBC WITH AUTO DIFFERENTIAL - Abnormal; Notable for the following components:       Result Value    WBC 24.9 (*)     Neutrophils Absolute 22.7 (*)     Lymphocytes Absolute 0.7 (*)     Monocytes Absolute 1.7 (*)     All other components within normal limits   COMPREHENSIVE METABOLIC PANEL - Abnormal; Notable for the following components:    Chloride 87 (*)     Anion Gap 20 (*)     Glucose 338 (*)     Total Bilirubin 1.4 (*) All other components within normal limits   LIPASE - Abnormal; Notable for the following components:    Lipase 9 (*)     All other components within normal limits   BETA-HYDROXYBUTYRATE - Abnormal; Notable for the following components:    Beta-Hydroxybutyrate 21.1 (*)     All other components within normal limits   URINALYSIS - Abnormal; Notable for the following components:    Color, UA ORANGE (*)     Glucose, Ur >=1000 (*)     Ketones, Urine >=80 (*)     Blood, Urine SMALL (*)     Protein, UA 30 (*)     All other components within normal limits   POCT VENOUS - Abnormal; Notable for the following components:    POC Sodium 132 (*)     POC Chloride 98 (*)     POC Glucose 314 (*)     Calcium, Ionized 0.97 (*)     pH, Andres 7.447 (*)     pCO2, Andres 32.8 (*)     HCO3, Venous 22.6 (*)     Lactate 2.33 (*)     Hemoglobin 19.0 (*)     POC Hematocrit 56 (*)     All other components within normal limits   COVID-19, RAPID   RAPID INFLUENZA A/B ANTIGENS   LACTIC ACID   MICROSCOPIC URINALYSIS   PROCALCITONIN   POCT EPOC BLOOD GAS, LACTIC ACID, ICA   POCT GLUCOSE       All other labs were within normal range or not returned as of this dictation. EMERGENCY DEPARTMENT COURSE and DIFFERENTIAL DIAGNOSIS/MDM:   Vitals:    Vitals:    11/17/22 1203 11/17/22 1204 11/17/22 1445 11/17/22 1500   BP:  (!) 144/85 132/68 131/78   Pulse: (!) 110  (!) 103 (!) 101   Resp: 18  20 20   Temp: 98.1 °F (36.7 °C)      TempSrc: Temporal      SpO2: 96%  99% 95%   Weight: 240 lb (108.9 kg)               MDM  Number of Diagnoses or Management Options  Hyperglycemia  Nausea  Pneumonia of left lower lobe due to infectious organism  Diagnosis management comments: Sent presented to the emergency department with complaint of nausea vomiting which she states been ongoing since the 15th, patient states he had cataract surgery at that time, and is currently on topical steroids for his surgery. He states his blood sugars have been running consistently high.   He has had decreased appetite over the same timeframe, he has been given IV fluids, Zofran while in the ED, is more comfortable at this time. Initial blood glucose level is 338, patient was given subcu insulin while in the ED as well as IV hydration CO2 is 23, lactic acid is 1.7, pH is 7.44 anion gap 20. Chest x-ray was obtained on this patient shows concerns for atelectasis versus right lower lobe infiltrate. He does have a 24,000 white count, but does not present with a fever, not having cough, shortness of breath. White count is suspected to be elevated secondary to steroid use from recent cataract surgery. She was placed on antibiotics, as well as given Zofran for home, he was encouraged to increase his oral fluid intake and monitor his blood glucose levels, should he have any worsening or change in condition, he was advised to return to the ED. Amount and/or Complexity of Data Reviewed  Decide to obtain previous medical records or to obtain history from someone other than the patient: yes        CRITICAL CARE TIME   Total Critical Care time was  minutes, excluding separately reportableprocedures. There was a high probability of clinicallysignificant/life threatening deterioration in the patient's condition which required my urgent intervention. CONSULTS:  None    PROCEDURES:  Unless otherwise noted below, none     Procedures    FINAL IMPRESSION      1. Pneumonia of left lower lobe due to infectious organism    2. Hyperglycemia    3. Nausea          DISPOSITION/PLAN   DISPOSITION Decision To Discharge 11/17/2022 03:49:23 PM      PATIENT REFERRED TO:  Amarilis Morin MD  [address] [phone]    In 2 days      Katia Lau MD  [address]    In 4 days      DISCHARGE MEDICATIONS:  New Prescriptions    AZITHROMYCIN (ZITHROMAX) 250 MG TABLET    Take 2 tablets (500 mg) on Day 1, followed by 1 tablet (250 mg) once daily on Days 2 through 5.     ONDANSETRON (ZOFRAN ODT) 4 MG DISINTEGRATING TABLET    Take 1 tablet by mouth every 8 hours as needed for Nausea          (Please note that portions of this note were completed with a voice recognition program.  Efforts were made to edit the dictations but occasionally words are mis-transcribed.)    Aliza Regalado PA-C (electronically signed)  Attending Emergency Physician         Aliza Regalado PA-C  11/17/22 6035

## 2022-11-21 RX ORDER — INSULIN HUMAN 4-8-12(60)
KIT INHALATION
Qty: 360 EACH | Refills: 3 | Status: SHIPPED | OUTPATIENT
Start: 2022-11-21

## 2022-11-23 ENCOUNTER — OFFICE VISIT (OUTPATIENT)
Dept: INTERNAL MEDICINE | Age: 37
End: 2022-11-23
Payer: OTHER GOVERNMENT

## 2022-11-23 VITALS
TEMPERATURE: 98.3 F | BODY MASS INDEX: 31.49 KG/M2 | SYSTOLIC BLOOD PRESSURE: 114 MMHG | OXYGEN SATURATION: 94 % | WEIGHT: 245.4 LBS | DIASTOLIC BLOOD PRESSURE: 62 MMHG | HEIGHT: 74 IN | HEART RATE: 95 BPM

## 2022-11-23 DIAGNOSIS — Z09 HOSPITAL DISCHARGE FOLLOW-UP: Primary | ICD-10-CM

## 2022-11-23 DIAGNOSIS — R11.2 NAUSEA AND VOMITING, UNSPECIFIED VOMITING TYPE: ICD-10-CM

## 2022-11-23 PROCEDURE — 99214 OFFICE O/P EST MOD 30 MIN: CPT | Performed by: FAMILY MEDICINE

## 2022-11-23 SDOH — ECONOMIC STABILITY: FOOD INSECURITY: WITHIN THE PAST 12 MONTHS, THE FOOD YOU BOUGHT JUST DIDN'T LAST AND YOU DIDN'T HAVE MONEY TO GET MORE.: NEVER TRUE

## 2022-11-23 SDOH — ECONOMIC STABILITY: FOOD INSECURITY: WITHIN THE PAST 12 MONTHS, YOU WORRIED THAT YOUR FOOD WOULD RUN OUT BEFORE YOU GOT MONEY TO BUY MORE.: NEVER TRUE

## 2022-11-23 ASSESSMENT — ENCOUNTER SYMPTOMS
COUGH: 0
SHORTNESS OF BREATH: 0
DIARRHEA: 0
WHEEZING: 0
CONSTIPATION: 0
SORE THROAT: 0
RHINORRHEA: 0
ABDOMINAL PAIN: 0

## 2022-11-23 ASSESSMENT — PATIENT HEALTH QUESTIONNAIRE - PHQ9
SUM OF ALL RESPONSES TO PHQ QUESTIONS 1-9: 0
SUM OF ALL RESPONSES TO PHQ QUESTIONS 1-9: 0
1. LITTLE INTEREST OR PLEASURE IN DOING THINGS: 0
SUM OF ALL RESPONSES TO PHQ9 QUESTIONS 1 & 2: 0
SUM OF ALL RESPONSES TO PHQ QUESTIONS 1-9: 0
2. FEELING DOWN, DEPRESSED OR HOPELESS: 0
SUM OF ALL RESPONSES TO PHQ QUESTIONS 1-9: 0

## 2022-11-23 ASSESSMENT — SOCIAL DETERMINANTS OF HEALTH (SDOH): HOW HARD IS IT FOR YOU TO PAY FOR THE VERY BASICS LIKE FOOD, HOUSING, MEDICAL CARE, AND HEATING?: NOT HARD AT ALL

## 2022-11-23 NOTE — PROGRESS NOTES
file   Social History Narrative    Not on file     Social Determinants of Health     Financial Resource Strain: Low Risk     Difficulty of Paying Living Expenses: Not hard at all   Food Insecurity: No Food Insecurity    Worried About Running Out of Food in the Last Year: Never true    Ran Out of Food in the Last Year: Never true   Transportation Needs: Not on file   Physical Activity: Not on file   Stress: Not on file   Social Connections: Not on file   Intimate Partner Violence: Not on file   Housing Stability: Not on file     No family history on file. No Known Allergies  Current Outpatient Medications   Medication Sig Dispense Refill    AFREZZA 60x4 &60x8 & 60x12 UNIT POWD INHALE 8 TO 24 UNITS BY MOUTH AT MEALTIME AND ADDITIONAL FOR GLUCOSE CONTROL.  MAXIMUM DAILY DOSE 120 UNITS 360 each 3    azithromycin (ZITHROMAX) 250 MG tablet Take 2 tablets (500 mg) on Day 1, followed by 1 tablet (250 mg) once daily on Days 2 through 5. 1 packet 0    ondansetron (ZOFRAN ODT) 4 MG disintegrating tablet Take 1 tablet by mouth every 8 hours as needed for Nausea 20 tablet 0    esomeprazole (NEXIUM) 40 MG delayed release capsule Take 1 capsule by mouth every morning (before breakfast) 30 capsule 0    BASAGLAR KWIKPEN 100 UNIT/ML injection pen INJECT 12 UNITS SUBCUTANEOUSLY IN THE MORNING AND 22 IN THE EVENING 15 mL 3    Insulin Regular Human (AFREZZA) 60x4 &60x8 & 60x12 UNIT POWD Lot [id number] Exp 10/22 2 boxes were given 2 each 0    Insulin Regular Human (AFREZZA) 60x4 &60x8 & 60x12 UNIT POWD INHALE 8-24 UNITS BY MOUTH AT MEALTIME AND ADDITIONAL FOR GLUCOSE CONTROL (MAX DAILY DOSE 120 UNITS) 360 each 3    Insulin Regular Human 60x4 &60x8 & 60x12 UNIT POWD Take 16 units  Am 8 units lunch and 16 units dinner 90 each 5    Insulin Pen Needle 32G X 4 MM MISC qid 200 each 3    insulin aspart (NOVOLOG FLEXPEN) 100 UNIT/ML injection pen 8 to units with each meals 10 pen 3    Insulin Pen Needle (RELION PEN NEEDLES) 31G X 6 MM MISC Use 5 daily with insulin 100 each 3    Insulin Regular Human 4 & 8 & 12 units POWD Take 16 units  Am 8 units lunch and 16 units dinner 60 each 3    blood glucose test strips (ACCU-CHEK GUIDE) strip Pt test 4x daily Dx E10.65 150 each 3    Glucagon (GVOKE HYPOPEN 2-PACK) 1 MG/0.2ML SOAJ As needed 2 Syringe 3    Continuous Blood Gluc Transmit (DEXCOM G6 TRANSMITTER) MISC Change every 3 months 1 each 3    Continuous Blood Gluc Sensor (DEXCOM G6 SENSOR) MISC Change every 10 days 1 each 11    Continuous Blood Gluc  (DEXCOM G6 ) CORY Continuous 1 Device 0     No current facility-administered medications for this visit. Vitals:    11/23/22 1354 11/23/22 1408   BP: 114/62    Site: Right Upper Arm    Position: Sitting    Cuff Size: Large Adult    Pulse: (!) 112 95   Temp: 98.3 °F (36.8 °C)    SpO2: 94%    Weight: 245 lb 6.4 oz (111.3 kg)    Height: 6' 2\" (1.88 m)        Physical exam:  Physical Exam  Vitals reviewed. Constitutional:       General: He is not in acute distress. Appearance: He is well-developed. HENT:      Head: Normocephalic and atraumatic. Mouth/Throat:      Pharynx: No oropharyngeal exudate. Neck:      Thyroid: No thyromegaly. Cardiovascular:      Rate and Rhythm: Normal rate and regular rhythm. Heart sounds: Normal heart sounds. No murmur heard. Pulmonary:      Effort: Pulmonary effort is normal. No respiratory distress. Breath sounds: Normal breath sounds. No wheezing. Abdominal:      General: There is no distension. Palpations: Abdomen is soft. Tenderness: There is no abdominal tenderness. There is no guarding or rebound. Musculoskeletal:      Cervical back: Normal range of motion. Lymphadenopathy:      Cervical: No cervical adenopathy. Skin:     General: Skin is warm and dry. Neurological:      Mental Status: He is alert and oriented to person, place, and time.    Psychiatric:         Behavior: Behavior normal.       Assessment/Plan:  36 y.o. male here mainly for hosp f/u:  - exam is benign; symptoms resolved; vitals reassuring; appears back to baseline other than the issues he is currently handling with eye clinic. Diagnosis Orders   1. Hospital discharge follow-up        2. Nausea and vomiting, unspecified vomiting type             Return if symptoms worsen or fail to improve.     Leonie Ku MD

## 2022-12-02 ENCOUNTER — OFFICE VISIT (OUTPATIENT)
Dept: ENDOCRINOLOGY | Age: 37
End: 2022-12-02

## 2022-12-02 VITALS
BODY MASS INDEX: 30.54 KG/M2 | HEIGHT: 74 IN | HEART RATE: 83 BPM | SYSTOLIC BLOOD PRESSURE: 133 MMHG | OXYGEN SATURATION: 97 % | WEIGHT: 238 LBS | DIASTOLIC BLOOD PRESSURE: 76 MMHG

## 2022-12-02 DIAGNOSIS — E10.65 TYPE I DIABETES MELLITUS WITH HYPEROSMOLAR COMA (HCC): Primary | ICD-10-CM

## 2022-12-02 DIAGNOSIS — E10.69 TYPE I DIABETES MELLITUS WITH HYPEROSMOLAR COMA (HCC): Primary | ICD-10-CM

## 2022-12-02 DIAGNOSIS — E87.0 TYPE I DIABETES MELLITUS WITH HYPEROSMOLAR COMA (HCC): Primary | ICD-10-CM

## 2022-12-02 LAB
ANION GAP SERPL CALCULATED.3IONS-SCNC: 13 MEQ/L (ref 9–15)
BUN BLDV-MCNC: 15 MG/DL (ref 6–20)
CALCIUM SERPL-MCNC: 9.8 MG/DL (ref 8.5–9.9)
CHLORIDE BLD-SCNC: 102 MEQ/L (ref 95–107)
CHP ED QC CHECK: NORMAL
CO2: 27 MEQ/L (ref 20–31)
CREAT SERPL-MCNC: 0.87 MG/DL (ref 0.7–1.2)
GFR SERPL CREATININE-BSD FRML MDRD: >60 ML/MIN/{1.73_M2}
GLUCOSE BLD-MCNC: 62 MG/DL
GLUCOSE FASTING: 63 MG/DL (ref 70–99)
HBA1C MFR BLD: 7.5 % (ref 4.8–5.9)
POTASSIUM SERPL-SCNC: 3.8 MEQ/L (ref 3.4–4.9)
SODIUM BLD-SCNC: 142 MEQ/L (ref 135–144)

## 2022-12-11 ASSESSMENT — ENCOUNTER SYMPTOMS: EYES NEGATIVE: 1

## 2022-12-11 NOTE — PROGRESS NOTES
12/2/2022    Assessment:       Diagnosis Orders   1. Type I diabetes mellitus with hyperosmolar coma ([facility] [address]  POCT Glucose            PLAN:     Orders Placed This Encounter   Procedures    POCT Glucose     Continue patient on current insulin regimen patient to follow-up in 3 to 6 months time  Basaglar 12 units in the morning 22 units in the evening plus NovoLog up to 8 units with each meals  Patient also to take Afrezza hours a week when he does not work  Orders Placed This Encounter   Procedures    POCT Glucose     No orders of the defined types were placed in this encounter. No follow-ups on file. Subjective:     Chief Complaint   Patient presents with    Diabetes     Vitals:    12/02/22 0934   BP: 133/76   Pulse: 83   SpO2: 97%   Weight: 238 lb (108 kg)   Height: 6' 2\" (1.88 m)     Wt Readings from Last 3 Encounters:   12/02/22 238 lb (108 kg)   11/23/22 245 lb 6.4 oz (111.3 kg)   11/17/22 240 lb (108.9 kg)     BP Readings from Last 3 Encounters:   12/02/22 133/76   11/23/22 114/62   11/17/22 128/73     Follow-up on type 1 diabetes patient is on Basaglar plus NovoLog using Dexcom continuous glucose monitoring denies any severe hypoglycemia hemoglobin A1c was 7.5 recently also had FEV1 pulmonary function test which were normal was reviewed seeing pulmonologist  Patient also seen recently ER for pneumonia and was given antibiotics    Diabetes  He presents for his follow-up diabetic visit. He has type 1 diabetes mellitus. Pertinent negatives for diabetes include no polyuria and no weight loss. There are no hypoglycemic complications. Symptoms are worsening. There are no diabetic complications. Current diabetic treatment includes insulin injections. He is currently taking insulin pre-breakfast, pre-lunch, pre-dinner and at bedtime. His overall blood glucose range is 140-180 mg/dl.  (Hemoglobin A1C       Date                     Value               Ref Range           Status                12/02/2022 7.5 (H)             4.8 - 5.9 %         Final            ----------  )   Past Medical History:   Diagnosis Date    DM w/ coma type I, uncontrolled 6/11/2021     No past surgical history on file. Social History     Socioeconomic History    Marital status:      Spouse name: Not on file    Number of children: Not on file    Years of education: Not on file    Highest education level: Not on file   Occupational History    Not on file   Tobacco Use    Smoking status: Never    Smokeless tobacco: Never   Vaping Use    Vaping Use: Never used   Substance and Sexual Activity    Alcohol use: Yes    Drug use: Never    Sexual activity: Yes   Other Topics Concern    Not on file   Social History Narrative    Not on file     Social Determinants of Health     Financial Resource Strain: Low Risk     Difficulty of Paying Living Expenses: Not hard at all   Food Insecurity: No Food Insecurity    Worried About Running Out of Food in the Last Year: Never true    Ran Out of Food in the Last Year: Never true   Transportation Needs: Not on file   Physical Activity: Not on file   Stress: Not on file   Social Connections: Not on file   Intimate Partner Violence: Not on file   Housing Stability: Not on file     No family history on file.   No Known Allergies    Current Outpatient Medications:     ondansetron (ZOFRAN ODT) 4 MG disintegrating tablet, Take 1 tablet by mouth every 8 hours as needed for Nausea, Disp: 20 tablet, Rfl: 0    esomeprazole (NEXIUM) 40 MG delayed release capsule, Take 1 capsule by mouth every morning (before breakfast), Disp: 30 capsule, Rfl: 0    BASAGLAR KWIKPEN 100 UNIT/ML injection pen, INJECT 12 UNITS SUBCUTANEOUSLY IN THE MORNING AND 22 IN THE EVENING, Disp: 15 mL, Rfl: 3    Insulin Regular Human (AFREZZA) 60x4 &60x8 & 60x12 UNIT POWD, Lot [id number] Exp 10/22 2 boxes were given, Disp: 2 each, Rfl: 0    Insulin Regular Human (AFREZZA) 60x4 &60x8 & 60x12 UNIT POWD, INHALE 8-24 UNITS BY MOUTH AT MEALTIME AND ADDITIONAL FOR GLUCOSE CONTROL (MAX DAILY DOSE 120 UNITS), Disp: 360 each, Rfl: 3    Insulin Regular Human 60x4 &60x8 & 60x12 UNIT POWD, Take 16 units  Am 8 units lunch and 16 units dinner, Disp: 90 each, Rfl: 5    Insulin Pen Needle 32G X 4 MM MISC, qid, Disp: 200 each, Rfl: 3    insulin aspart (NOVOLOG FLEXPEN) 100 UNIT/ML injection pen, 8 to units with each meals, Disp: 10 pen, Rfl: 3    Insulin Pen Needle (RELION PEN NEEDLES) 31G X 6 MM MISC, Use 5 daily with insulin, Disp: 100 each, Rfl: 3    Insulin Regular Human 4 & 8 & 12 units POWD, Take 16 units  Am 8 units lunch and 16 units dinner, Disp: 60 each, Rfl: 3    blood glucose test strips (ACCU-CHEK GUIDE) strip, Pt test 4x daily Dx E10.65, Disp: 150 each, Rfl: 3    Glucagon (GVOKE HYPOPEN 2-PACK) 1 MG/0.2ML SOAJ, As needed, Disp: 2 Syringe, Rfl: 3    Continuous Blood Gluc Transmit (DEXCOM G6 TRANSMITTER) MISC, Change every 3 months, Disp: 1 each, Rfl: 3    Continuous Blood Gluc Sensor (DEXCOM G6 SENSOR) MISC, Change every 10 days, Disp: 1 each, Rfl: 11    Continuous Blood Gluc  (DEXCOM G6 ) CORY, Continuous, Disp: 1 Device, Rfl: 0    AFREZZA 60x4 &60x8 & 60x12 UNIT POWD, INHALE 8 TO 24 UNITS BY MOUTH AT MEALTIME AND ADDITIONAL FOR GLUCOSE CONTROL.  MAXIMUM DAILY DOSE 120 UNITS, Disp: 360 each, Rfl: 3  Lab Results   Component Value Date     12/02/2022    K 3.8 12/02/2022     12/02/2022    CO2 27 12/02/2022    BUN 15 12/02/2022    CREATININE 0.87 12/02/2022    GLUCOSE 62 12/02/2022    CALCIUM 9.8 12/02/2022    PROT 7.8 11/17/2022    LABALBU 4.4 11/17/2022    BILITOT 1.4 (H) 11/17/2022    ALKPHOS 86 11/17/2022    AST 22 11/17/2022    ALT 17 11/17/2022    LABGLOM >60.0 12/02/2022    GFRAA >60.0 08/26/2022    GLOB 3.4 11/17/2022     Lab Results   Component Value Date    WBC 24.9 (H) 11/17/2022    HGB 19.0 (H) 11/17/2022    HCT 44.1 11/17/2022    MCV 82.8 11/17/2022     11/17/2022     Lab Results   Component Value Date    LABA1C 7.5 (H) 12/02/2022    LABA1C 6.9 (H) 08/26/2022    LABA1C 7.0 (H) 05/19/2022     Lab Results   Component Value Date    CHOLFAST 166 07/22/2021    TRIGLYCFAST 58 07/22/2021    HDL 49 05/19/2022    HDL 55 07/22/2021    LDLCALC 127 05/19/2022    LDLCALC 99 07/22/2021    CHOL 194 05/19/2022    TRIG 90 05/19/2022     Lab Results   Component Value Date    TESTM 300 06/11/2021     Lab Results   Component Value Date    TSH 1.820 05/19/2022    TSH 3.650 06/11/2021    T4FREE 1.08 06/11/2021     Lab Results   Component Value Date    TPOABS <4.0 06/11/2021       Review of Systems   Constitutional:  Negative for weight loss. Eyes: Negative. Cardiovascular: Negative. Endocrine: Negative for polyuria. Neurological: Negative. All other systems reviewed and are negative. Objective:   Physical Exam  Vitals reviewed. Constitutional:       General: He is not in acute distress. Appearance: Normal appearance. HENT:      Head: Normocephalic and atraumatic. Right Ear: External ear normal.      Left Ear: External ear normal.      Nose: Nose normal.   Eyes:      General: No scleral icterus. Right eye: No discharge. Left eye: No discharge. Extraocular Movements: Extraocular movements intact. Conjunctiva/sclera: Conjunctivae normal.   Cardiovascular:      Rate and Rhythm: Normal rate. Pulmonary:      Effort: Pulmonary effort is normal.   Musculoskeletal:         General: Normal range of motion. Cervical back: Normal range of motion and neck supple. Neurological:      General: No focal deficit present. Mental Status: He is alert and oriented to person, place, and time.    Psychiatric:         Mood and Affect: Mood normal.         Behavior: Behavior normal.

## 2023-01-11 RX ORDER — INSULIN GLARGINE 100 [IU]/ML
INJECTION, SOLUTION SUBCUTANEOUS
Qty: 15 ML | Refills: 3 | Status: SHIPPED | OUTPATIENT
Start: 2023-01-11

## 2023-01-12 RX ORDER — INSULIN GLARGINE 100 [IU]/ML
INJECTION, SOLUTION SUBCUTANEOUS
Qty: 15 ML | Refills: 3 | OUTPATIENT
Start: 2023-01-12

## 2023-03-09 ENCOUNTER — OFFICE VISIT (OUTPATIENT)
Dept: ENDOCRINOLOGY | Age: 38
End: 2023-03-09

## 2023-03-09 VITALS
BODY MASS INDEX: 30.93 KG/M2 | WEIGHT: 241 LBS | OXYGEN SATURATION: 96 % | HEIGHT: 74 IN | SYSTOLIC BLOOD PRESSURE: 130 MMHG | DIASTOLIC BLOOD PRESSURE: 76 MMHG | HEART RATE: 75 BPM

## 2023-03-09 DIAGNOSIS — E10.69 TYPE I DIABETES MELLITUS WITH HYPEROSMOLAR COMA (HCC): ICD-10-CM

## 2023-03-09 DIAGNOSIS — E10.65 TYPE I DIABETES MELLITUS WITH HYPEROSMOLAR COMA (HCC): Primary | ICD-10-CM

## 2023-03-09 DIAGNOSIS — E10.69 TYPE I DIABETES MELLITUS WITH HYPEROSMOLAR COMA (HCC): Primary | ICD-10-CM

## 2023-03-09 DIAGNOSIS — E10.65 TYPE I DIABETES MELLITUS WITH HYPEROSMOLAR COMA (HCC): ICD-10-CM

## 2023-03-09 DIAGNOSIS — E87.0 TYPE I DIABETES MELLITUS WITH HYPEROSMOLAR COMA (HCC): Primary | ICD-10-CM

## 2023-03-09 DIAGNOSIS — E87.0 TYPE I DIABETES MELLITUS WITH HYPEROSMOLAR COMA (HCC): ICD-10-CM

## 2023-03-09 LAB
ANION GAP SERPL CALCULATED.3IONS-SCNC: 9 MEQ/L (ref 9–15)
BUN BLDV-MCNC: 17 MG/DL (ref 6–20)
CALCIUM SERPL-MCNC: 9.4 MG/DL (ref 8.5–9.9)
CHLORIDE BLD-SCNC: 101 MEQ/L (ref 95–107)
CHP ED QC CHECK: NORMAL
CO2: 29 MEQ/L (ref 20–31)
CREAT SERPL-MCNC: 0.92 MG/DL (ref 0.7–1.2)
GFR SERPL CREATININE-BSD FRML MDRD: >60 ML/MIN/{1.73_M2}
GLUCOSE BLD-MCNC: 136 MG/DL (ref 70–99)
GLUCOSE BLD-MCNC: 164 MG/DL
HBA1C MFR BLD: 7.7 % (ref 4.8–5.9)
POTASSIUM SERPL-SCNC: 3.5 MEQ/L (ref 3.4–4.9)
SODIUM BLD-SCNC: 139 MEQ/L (ref 135–144)

## 2023-03-09 ASSESSMENT — ENCOUNTER SYMPTOMS
EYES NEGATIVE: 1
VISUAL CHANGE: 0

## 2023-03-09 NOTE — PROGRESS NOTES
3/9/2023    Assessment:       Diagnosis Orders   1. Type I diabetes mellitus with hyperosmolar coma ([facility] [address]  POCT Glucose            PLAN:     Orders Placed This Encounter   Procedures    Hemoglobin A1C     Standing Status:   Future     Standing Expiration Date:   9/3/3137    Basic Metabolic Panel     Standing Status:   Future     Standing Expiration Date:   3/9/2024    POCT Glucose     Continue patient on current insulin regimen continue using Dexcom continuous glucose monitoring follow-up in 3 months time    Orders Placed This Encounter   Procedures    POCT Glucose     No orders of the defined types were placed in this encounter. No follow-ups on file. Subjective:     Chief Complaint   Patient presents with    Diabetes     Vitals:    03/09/23 0901   BP: 130/76   Site: Left Upper Arm   Position: Sitting   Cuff Size: Large Adult   Pulse: 75   SpO2: 96%   Weight: 241 lb (109.3 kg)   Height: 6' 2\" (1.88 m)     Wt Readings from Last 3 Encounters:   03/09/23 241 lb (109.3 kg)   12/02/22 238 lb (108 kg)   11/23/22 245 lb 6.4 oz (111.3 kg)     BP Readings from Last 3 Encounters:   03/09/23 130/76   12/02/22 133/76   11/23/22 114/62     Follow-up on type 2 diabetes patient on insulin injections using Dexcom continuous glucose monitoring labs were done today A1c is pending overall blood sugars been slightly high due to medication denies any hypoglycemia  On Basaglar 12 units in the morning 22 units in the evening plus NovoLog 8 units with each meals  Uses Afrezza insulin when not working    Diabetes  He presents for his follow-up diabetic visit. He has type 1 diabetes mellitus. Pertinent negatives for diabetes include no polydipsia, no polyuria and no visual change. Symptoms are stable. Current diabetic treatment includes insulin injections. He is currently taking insulin pre-breakfast, pre-lunch, pre-dinner and at bedtime.    Past Medical History:   Diagnosis Date    DM w/ coma type I, uncontrolled 6/11/2021     No past surgical history on file. Social History     Socioeconomic History    Marital status:      Spouse name: Not on file    Number of children: Not on file    Years of education: Not on file    Highest education level: Not on file   Occupational History    Not on file   Tobacco Use    Smoking status: Never    Smokeless tobacco: Never   Vaping Use    Vaping Use: Never used   Substance and Sexual Activity    Alcohol use: Yes    Drug use: Never    Sexual activity: Yes   Other Topics Concern    Not on file   Social History Narrative    Not on file     Social Determinants of Health     Financial Resource Strain: Low Risk     Difficulty of Paying Living Expenses: Not hard at all   Food Insecurity: No Food Insecurity    Worried About Running Out of Food in the Last Year: Never true    Ran Out of Food in the Last Year: Never true   Transportation Needs: Not on file   Physical Activity: Not on file   Stress: Not on file   Social Connections: Not on file   Intimate Partner Violence: Not on file   Housing Stability: Not on file     No family history on file. No Known Allergies    Current Outpatient Medications:     Insulin Pen Needle 32G X 4 MM MISC, qid, Disp: 200 each, Rfl: 3    BASAGLAR KWIKPEN 100 UNIT/ML injection pen, INJECT 12 UNITS SUBCUTANEOUSLY IN THE MORNING AND INJECT 22 UNITS SUBCUTANEOUSLY IN THE EVENING, Disp: 15 mL, Rfl: 3    AFREZZA 60x4 &60x8 & 60x12 UNIT POWD, INHALE 8 TO 24 UNITS BY MOUTH AT MEALTIME AND ADDITIONAL FOR GLUCOSE CONTROL.  MAXIMUM DAILY DOSE 120 UNITS, Disp: 360 each, Rfl: 3    ondansetron (ZOFRAN ODT) 4 MG disintegrating tablet, Take 1 tablet by mouth every 8 hours as needed for Nausea, Disp: 20 tablet, Rfl: 0    esomeprazole (NEXIUM) 40 MG delayed release capsule, Take 1 capsule by mouth every morning (before breakfast), Disp: 30 capsule, Rfl: 0    Insulin Regular Human (AFREZZA) 60x4 &60x8 & 60x12 UNIT POWD, Lot [id number] Exp 10/22 2 boxes were given, Disp: 2 each, Rfl: 0    Insulin Regular Human (AFREZZA) 60x4 &60x8 & 60x12 UNIT POWD, INHALE 8-24 UNITS BY MOUTH AT MEALTIME AND ADDITIONAL FOR GLUCOSE CONTROL (MAX DAILY DOSE 120 UNITS), Disp: 360 each, Rfl: 3    Insulin Regular Human 60x4 &60x8 & 60x12 UNIT POWD, Take 16 units  Am 8 units lunch and 16 units dinner, Disp: 90 each, Rfl: 5    insulin aspart (NOVOLOG FLEXPEN) 100 UNIT/ML injection pen, 8 to units with each meals, Disp: 10 pen, Rfl: 3    Insulin Pen Needle (RELION PEN NEEDLES) 31G X 6 MM MISC, Use 5 daily with insulin, Disp: 100 each, Rfl: 3    Insulin Regular Human 4 & 8 & 12 units POWD, Take 16 units  Am 8 units lunch and 16 units dinner, Disp: 60 each, Rfl: 3    blood glucose test strips (ACCU-CHEK GUIDE) strip, Pt test 4x daily Dx E10.65, Disp: 150 each, Rfl: 3    Glucagon (GVOKE HYPOPEN 2-PACK) 1 MG/0.2ML SOAJ, As needed, Disp: 2 Syringe, Rfl: 3    Continuous Blood Gluc Transmit (DEXCOM G6 TRANSMITTER) MISC, Change every 3 months, Disp: 1 each, Rfl: 3    Continuous Blood Gluc Sensor (DEXCOM G6 SENSOR) MISC, Change every 10 days, Disp: 1 each, Rfl: 11    Continuous Blood Gluc  (DEXCOM G6 ) CORY, Continuous, Disp: 1 Device, Rfl: 0  Lab Results   Component Value Date     12/02/2022    K 3.8 12/02/2022     12/02/2022    CO2 27 12/02/2022    BUN 15 12/02/2022    CREATININE 0.87 12/02/2022    GLUCOSE 164 03/09/2023    CALCIUM 9.8 12/02/2022    PROT 7.8 11/17/2022    LABALBU 4.4 11/17/2022    BILITOT 1.4 (H) 11/17/2022    ALKPHOS 86 11/17/2022    AST 22 11/17/2022    ALT 17 11/17/2022    LABGLOM >60.0 12/02/2022    GFRAA >60.0 08/26/2022    GLOB 3.4 11/17/2022     Lab Results   Component Value Date    WBC 24.9 (H) 11/17/2022    HGB 19.0 (H) 11/17/2022    HCT 44.1 11/17/2022    MCV 82.8 11/17/2022     11/17/2022     Lab Results   Component Value Date    LABA1C 7.5 (H) 12/02/2022    LABA1C 6.9 (H) 08/26/2022    LABA1C 7.0 (H) 05/19/2022     Lab Results   Component Value Date    CHOLFAST 166 07/22/2021 TRIGLYCFAST 58 07/22/2021    HDL 49 05/19/2022    HDL 55 07/22/2021    LDLCALC 127 05/19/2022    LDLCALC 99 07/22/2021    CHOL 194 05/19/2022    TRIG 90 05/19/2022     Lab Results   Component Value Date    TESTM 300 06/11/2021     Lab Results   Component Value Date    TSH 1.820 05/19/2022    TSH 3.650 06/11/2021    T4FREE 1.08 06/11/2021     Lab Results   Component Value Date    TPOABS <4.0 06/11/2021       Review of Systems   Eyes: Negative. Cardiovascular: Negative. Endocrine: Negative for polydipsia and polyuria. Neurological: Negative. All other systems reviewed and are negative. Objective:   Physical Exam  Vitals reviewed. Constitutional:       General: He is not in acute distress. Appearance: Normal appearance. He is obese. HENT:      Head: Normocephalic and atraumatic. Right Ear: External ear normal.      Left Ear: External ear normal.      Nose: Nose normal.   Eyes:      General: No scleral icterus. Right eye: No discharge. Left eye: No discharge. Extraocular Movements: Extraocular movements intact. Conjunctiva/sclera: Conjunctivae normal.   Cardiovascular:      Rate and Rhythm: Normal rate. Pulmonary:      Effort: Pulmonary effort is normal.   Musculoskeletal:         General: Normal range of motion. Cervical back: Normal range of motion and neck supple. Neurological:      General: No focal deficit present. Mental Status: He is alert and oriented to person, place, and time.    Psychiatric:         Mood and Affect: Mood normal.         Behavior: Behavior normal.

## 2023-05-25 ENCOUNTER — OFFICE VISIT (OUTPATIENT)
Dept: CARDIOLOGY CLINIC | Age: 38
End: 2023-05-25
Payer: OTHER GOVERNMENT

## 2023-05-25 VITALS
OXYGEN SATURATION: 97 % | WEIGHT: 245 LBS | HEART RATE: 75 BPM | SYSTOLIC BLOOD PRESSURE: 122 MMHG | BODY MASS INDEX: 31.46 KG/M2 | DIASTOLIC BLOOD PRESSURE: 78 MMHG

## 2023-05-25 DIAGNOSIS — E10.69 TYPE 1 DIABETES MELLITUS WITH HYPEROSMOLARITY WITHOUT COMA (HCC): ICD-10-CM

## 2023-05-25 DIAGNOSIS — E10.65 TYPE 1 DIABETES MELLITUS WITH HYPEROSMOLARITY WITHOUT COMA (HCC): ICD-10-CM

## 2023-05-25 DIAGNOSIS — Z00.00 PE (PHYSICAL EXAM), ANNUAL: Primary | ICD-10-CM

## 2023-05-25 DIAGNOSIS — E87.0 TYPE 1 DIABETES MELLITUS WITH HYPEROSMOLARITY WITHOUT COMA (HCC): ICD-10-CM

## 2023-05-25 PROCEDURE — 3051F HG A1C>EQUAL 7.0%<8.0%: CPT | Performed by: INTERNAL MEDICINE

## 2023-05-25 PROCEDURE — 99214 OFFICE O/P EST MOD 30 MIN: CPT | Performed by: INTERNAL MEDICINE

## 2023-05-25 PROCEDURE — 93000 ELECTROCARDIOGRAM COMPLETE: CPT | Performed by: INTERNAL MEDICINE

## 2023-05-25 ASSESSMENT — ENCOUNTER SYMPTOMS
WHEEZING: 0
BLOOD IN STOOL: 0
SHORTNESS OF BREATH: 0
STRIDOR: 0
EYES NEGATIVE: 1
COUGH: 0
CHEST TIGHTNESS: 0
NAUSEA: 0
GASTROINTESTINAL NEGATIVE: 1
RESPIRATORY NEGATIVE: 1

## 2023-05-25 NOTE — PROGRESS NOTES
OFFICE VISIT         Patient: Romel Zuñiga  YOB: 1985  MRN: [id number]    Chief Complaint:  Chief Complaint   Patient presents with    1 Year Follow Up       CV Data:    Subjective/HPI  [address] [place]  employment. Pt is activ ehas no symptoms no cp exercises 4 days  A week with no issues. 5/25/23 doing very well exercise all the time no cp no sob not dizzy no falls no bleed active otherwise. EKG:     +FH  Work - [employer]  Lives with wife  Nonsmoker  occ ETOH    Past Medical History:   Diagnosis Date    DM w/ coma type I, uncontrolled 6/11/2021       No past surgical history on file. No family history on file. Social History     Socioeconomic History    Marital status:      Spouse name: None    Number of children: None    Years of education: None    Highest education level: None   Tobacco Use    Smoking status: Never    Smokeless tobacco: Never   Vaping Use    Vaping Use: Never used   Substance and Sexual Activity    Alcohol use: Yes    Drug use: Never    Sexual activity: Yes     Social Determinants of Health     Financial Resource Strain: Low Risk     Difficulty of Paying Living Expenses: Not hard at all   Food Insecurity: No Food Insecurity    Worried About Running Out of Food in the Last Year: Never true    Ran Out of Food in the Last Year: Never true       No Known Allergies    Current Outpatient Medications   Medication Sig Dispense Refill    Insulin Pen Needle 32G X 4 MM MISC qid 200 each 3    BASAGLAR KWIKPEN 100 UNIT/ML injection pen INJECT 12 UNITS SUBCUTANEOUSLY IN THE MORNING AND INJECT 22 UNITS SUBCUTANEOUSLY IN THE EVENING 15 mL 3    AFREZZA 60x4 &60x8 & 60x12 UNIT POWD INHALE 8 TO 24 UNITS BY MOUTH AT MEALTIME AND ADDITIONAL FOR GLUCOSE CONTROL.  MAXIMUM DAILY DOSE 120 UNITS 360 each 3    Insulin Regular Human [facility]) 60x4 &60x8 & 60x12 UNIT POWD Lot [id number] Exp 10/22 2 boxes were given 2 each 0    Insulin Regular Human (AFREZZA) 60x4 &60x8 & 60x12 UNIT POWD

## 2023-06-05 ENCOUNTER — OFFICE VISIT (OUTPATIENT)
Dept: PULMONOLOGY | Age: 38
End: 2023-06-05

## 2023-06-05 VITALS
SYSTOLIC BLOOD PRESSURE: 126 MMHG | BODY MASS INDEX: 31.07 KG/M2 | OXYGEN SATURATION: 97 % | TEMPERATURE: 97.1 F | WEIGHT: 242 LBS | HEART RATE: 62 BPM | DIASTOLIC BLOOD PRESSURE: 70 MMHG

## 2023-06-05 DIAGNOSIS — E10.9 TYPE 1 DIABETES MELLITUS WITHOUT COMPLICATION (HCC): ICD-10-CM

## 2023-06-05 DIAGNOSIS — Z79.899 MEDICATION MANAGEMENT: Primary | ICD-10-CM

## 2023-06-05 ASSESSMENT — ENCOUNTER SYMPTOMS
SORE THROAT: 0
NAUSEA: 0
VOMITING: 0
RHINORRHEA: 0
SHORTNESS OF BREATH: 0
CHEST TIGHTNESS: 0
VOICE CHANGE: 0
WHEEZING: 0
EYE ITCHING: 0
ABDOMINAL PAIN: 0
DIARRHEA: 0
COUGH: 0

## 2023-06-05 NOTE — PROGRESS NOTES
Findings: No rash. Neurological:      Mental Status: He is alert and oriented to person, place, and time. Cranial Nerves: No cranial nerve deficit. Psychiatric:         Behavior: Behavior normal.       Current Outpatient Medications   Medication Sig Dispense Refill    Insulin Pen Needle 32G X 4 MM MISC qid 200 each 3    BASAGLAR KWIKPEN 100 UNIT/ML injection pen INJECT 12 UNITS SUBCUTANEOUSLY IN THE MORNING AND INJECT 22 UNITS SUBCUTANEOUSLY IN THE EVENING 15 mL 3    AFREZZA 60x4 &60x8 & 60x12 UNIT POWD INHALE 8 TO 24 UNITS BY MOUTH AT MEALTIME AND ADDITIONAL FOR GLUCOSE CONTROL. MAXIMUM DAILY DOSE 120 UNITS 360 each 3    Insulin Regular Human [facility]) 60x4 &60x8 & 60x12 UNIT POWD Lot [id number] Exp 10/22 2 boxes were given 2 each 0    Insulin Regular Human (AFREZZA) 60x4 &60x8 & 60x12 UNIT POWD INHALE 8-24 UNITS BY MOUTH AT MEALTIME AND ADDITIONAL FOR GLUCOSE CONTROL (MAX DAILY DOSE 120 UNITS) 360 each 3    Insulin Regular Human 60x4 &60x8 & 60x12 UNIT POWD Take 16 units  Am 8 units lunch and 16 units dinner 90 each 5    insulin aspart (NOVOLOG FLEXPEN) 100 UNIT/ML injection pen 8 to units with each meals 10 pen 3    Insulin Pen Needle (RELION PEN NEEDLES) 31G X 6 MM MISC Use 5 daily with insulin 100 each 3    Insulin Regular Human 4 & 8 & 12 units POWD Take 16 units  Am 8 units lunch and 16 units dinner 60 each 3    blood glucose test strips (ACCU-CHEK GUIDE) strip Pt test 4x daily Dx E10.65 150 each 3    Glucagon (GVOKE HYPOPEN 2-PACK) 1 MG/0.2ML SOAJ As needed 2 Syringe 3    Continuous Blood Gluc Transmit (DEXCOM G6 TRANSMITTER) MISC Change every 3 months 1 each 3    Continuous Blood Gluc Sensor (DEXCOM G6 SENSOR) MISC Change every 10 days 1 each 11    Continuous Blood Gluc  (DEXCOM G6 ) CORY Continuous 1 Device 0     No current facility-administered medications for this visit.        No results found for this or any previous visit.  ]  Results for orders placed during the hospital

## 2023-06-07 RX ORDER — INSULIN HUMAN 4-8-12(60)
KIT INHALATION
Qty: 360 EACH | Refills: 3 | Status: SHIPPED | OUTPATIENT
Start: 2023-06-07

## 2023-06-15 DIAGNOSIS — E10.65 TYPE I DIABETES MELLITUS WITH HYPEROSMOLAR COMA (HCC): ICD-10-CM

## 2023-06-15 DIAGNOSIS — E87.0 TYPE I DIABETES MELLITUS WITH HYPEROSMOLAR COMA (HCC): ICD-10-CM

## 2023-06-15 DIAGNOSIS — E10.69 TYPE I DIABETES MELLITUS WITH HYPEROSMOLAR COMA (HCC): ICD-10-CM

## 2023-06-15 LAB
ANION GAP SERPL CALCULATED.3IONS-SCNC: 18 MEQ/L (ref 9–15)
BUN SERPL-MCNC: 14 MG/DL (ref 6–20)
CALCIUM SERPL-MCNC: 9.7 MG/DL (ref 8.5–9.9)
CHLORIDE SERPL-SCNC: 103 MEQ/L (ref 95–107)
CO2 SERPL-SCNC: 23 MEQ/L (ref 20–31)
CREAT SERPL-MCNC: 0.69 MG/DL (ref 0.7–1.2)
CREAT UR-MCNC: 224.4 MG/DL
GLUCOSE SERPL-MCNC: 95 MG/DL (ref 70–99)
HBA1C MFR BLD: 8.5 % (ref 4.8–5.9)
MICROALBUMIN UR-MCNC: 2.6 MG/DL
MICROALBUMIN/CREAT UR-RTO: 11.6 MG/G (ref 0–30)
POTASSIUM SERPL-SCNC: 3.4 MEQ/L (ref 3.4–4.9)
SODIUM SERPL-SCNC: 144 MEQ/L (ref 135–144)

## 2023-06-23 ENCOUNTER — HOSPITAL ENCOUNTER (OUTPATIENT)
Age: 38
Setting detail: SPECIMEN
Discharge: HOME | End: 2023-06-23
Payer: OTHER GOVERNMENT

## 2023-06-23 ENCOUNTER — OFFICE VISIT (OUTPATIENT)
Dept: FAMILY MEDICINE CLINIC | Age: 38
End: 2023-06-23

## 2023-06-23 VITALS
WEIGHT: 244 LBS | HEIGHT: 74 IN | TEMPERATURE: 97.3 F | HEART RATE: 73 BPM | OXYGEN SATURATION: 97 % | DIASTOLIC BLOOD PRESSURE: 82 MMHG | BODY MASS INDEX: 31.32 KG/M2 | SYSTOLIC BLOOD PRESSURE: 126 MMHG

## 2023-06-23 DIAGNOSIS — R17 JAUNDICE: ICD-10-CM

## 2023-06-23 DIAGNOSIS — Z11.4 ENCOUNTER FOR SCREENING FOR HIV: ICD-10-CM

## 2023-06-23 DIAGNOSIS — R39.9 LOWER URINARY TRACT SYMPTOMS (LUTS): ICD-10-CM

## 2023-06-23 DIAGNOSIS — Z00.00 ANNUAL PHYSICAL EXAM: Primary | ICD-10-CM

## 2023-06-23 DIAGNOSIS — Z00.00 ANNUAL PHYSICAL EXAM: ICD-10-CM

## 2023-06-23 DIAGNOSIS — Z11.59 ENCOUNTER FOR HEPATITIS C SCREENING TEST FOR LOW RISK PATIENT: ICD-10-CM

## 2023-06-23 PROBLEM — Z79.899 MEDICATION MANAGEMENT: Status: RESOLVED | Noted: 2023-06-05 | Resolved: 2023-06-23

## 2023-06-23 LAB
ALBUMIN SERPL-MCNC: 3.9 G/DL (ref 3.5–4.6)
ALP SERPL-CCNC: 638 U/L (ref 35–104)
ALT SERPL-CCNC: 418 U/L (ref 0–41)
ANION GAP SERPL CALCULATED.3IONS-SCNC: 13 MEQ/L (ref 9–15)
AST SERPL-CCNC: 160 U/L (ref 0–40)
BILIRUB DIRECT SERPL-MCNC: 8.2 MG/DL (ref 0–0.4)
BILIRUB INDIRECT SERPL-MCNC: 2.5 MG/DL (ref 0–0.6)
BILIRUB SERPL-MCNC: 10.7 MG/DL (ref 0.2–0.7)
BILIRUBIN, POC: ABNORMAL
BLOOD URINE, POC: ABNORMAL
BUN SERPL-MCNC: 12 MG/DL (ref 6–20)
CALCIUM SERPL-MCNC: 8.8 MG/DL (ref 8.5–9.9)
CHLORIDE SERPL-SCNC: 102 MEQ/L (ref 95–107)
CHOLEST SERPL-MCNC: 503 MG/DL (ref 0–199)
CLARITY, POC: CLEAR
CO2 SERPL-SCNC: 22 MEQ/L (ref 20–31)
COLOR, POC: ABNORMAL
CREAT SERPL-MCNC: 0.56 MG/DL (ref 0.7–1.2)
GLUCOSE SERPL-MCNC: 205 MG/DL (ref 70–99)
GLUCOSE URINE, POC: ABNORMAL
HDLC SERPL-MCNC: 10 MG/DL (ref 40–59)
KETONES, POC: ABNORMAL
LDL CHOLESTEROL CALCULATED: 461 MG/DL (ref 0–129)
LEUKOCYTE EST, POC: ABNORMAL
NITRITE, POC: ABNORMAL
PH, POC: 6
POTASSIUM SERPL-SCNC: 4 MEQ/L (ref 3.4–4.9)
PROT SERPL-MCNC: 6.6 G/DL (ref 6.3–8)
PROTEIN, POC: ABNORMAL
SODIUM SERPL-SCNC: 137 MEQ/L (ref 135–144)
SPECIFIC GRAVITY, POC: 1.01
TRIGLYCERIDE, FASTING: 160 MG/DL (ref 0–150)
UROBILINOGEN, POC: ABNORMAL

## 2023-06-23 PROCEDURE — 80074 ACUTE HEPATITIS PANEL: CPT

## 2023-06-23 PROCEDURE — 87389 HIV-1 AG W/HIV-1&-2 AB AG IA: CPT

## 2023-06-23 PROCEDURE — 80048 BASIC METABOLIC PNL TOTAL CA: CPT

## 2023-06-23 PROCEDURE — 80076 HEPATIC FUNCTION PANEL: CPT

## 2023-06-23 PROCEDURE — 82977 ASSAY OF GGT: CPT

## 2023-06-23 PROCEDURE — 80061 LIPID PANEL: CPT

## 2023-06-23 SDOH — ECONOMIC STABILITY: FOOD INSECURITY: WITHIN THE PAST 12 MONTHS, THE FOOD YOU BOUGHT JUST DIDN'T LAST AND YOU DIDN'T HAVE MONEY TO GET MORE.: NEVER TRUE

## 2023-06-23 SDOH — ECONOMIC STABILITY: FOOD INSECURITY: WITHIN THE PAST 12 MONTHS, YOU WORRIED THAT YOUR FOOD WOULD RUN OUT BEFORE YOU GOT MONEY TO BUY MORE.: NEVER TRUE

## 2023-06-23 SDOH — ECONOMIC STABILITY: HOUSING INSECURITY
IN THE LAST 12 MONTHS, WAS THERE A TIME WHEN YOU DID NOT HAVE A STEADY PLACE TO SLEEP OR SLEPT IN A SHELTER (INCLUDING NOW)?: NO

## 2023-06-23 SDOH — ECONOMIC STABILITY: INCOME INSECURITY: HOW HARD IS IT FOR YOU TO PAY FOR THE VERY BASICS LIKE FOOD, HOUSING, MEDICAL CARE, AND HEATING?: NOT HARD AT ALL

## 2023-06-23 ASSESSMENT — ENCOUNTER SYMPTOMS
ABDOMINAL PAIN: 0
COUGH: 0
SHORTNESS OF BREATH: 0
RHINORRHEA: 0
DIARRHEA: 0
SORE THROAT: 0
CONSTIPATION: 0
WHEEZING: 0

## 2023-06-23 ASSESSMENT — PATIENT HEALTH QUESTIONNAIRE - PHQ9
1. LITTLE INTEREST OR PLEASURE IN DOING THINGS: 0
SUM OF ALL RESPONSES TO PHQ QUESTIONS 1-9: 0
2. FEELING DOWN, DEPRESSED OR HOPELESS: 0
SUM OF ALL RESPONSES TO PHQ9 QUESTIONS 1 & 2: 0
SUM OF ALL RESPONSES TO PHQ QUESTIONS 1-9: 0

## 2023-06-23 NOTE — PROGRESS NOTES
[address] [facility]  [address] [place] [address]  Dept: [phone]  Dept Fax: [phone]  Loc: [phone]     Chief Complaint  Chief Complaint   Patient presents with    Annual Exam    Jaundice     Dark urine, light color stools, upper right abdominal pain, nausea & vomiting, x1 week       HPI:  40 y.o.male who presents for the following:      Works as     GI: x1 week with RUQ abd pain on one day lasting a few hours; n/v; darker urine; lighter stools; no etoh; no diet changes; eyes are yellow now    Review of Systems   Constitutional:  Negative for chills and fever. HENT:  Negative for congestion, rhinorrhea and sore throat. Respiratory:  Negative for cough, shortness of breath and wheezing. Gastrointestinal:  Negative for abdominal pain, constipation and diarrhea. Endocrine: Negative for polydipsia and polyuria. Genitourinary:  Negative for dysuria, frequency and urgency. Neurological:  Negative for syncope, light-headedness, numbness and headaches. Psychiatric/Behavioral:  Negative for sleep disturbance. The patient is not nervous/anxious. Past Medical History:   Diagnosis Date    DM w/ coma type I, uncontrolled 6/11/2021     No past surgical history on file. Social History     Socioeconomic History    Marital status:      Spouse name: Not on file    Number of children: Not on file    Years of education: Not on file    Highest education level: Not on file   Occupational History    Not on file   Tobacco Use    Smoking status: Never    Smokeless tobacco: Never   Vaping Use    Vaping Use: Never used   Substance and Sexual Activity    Alcohol use:  Yes     Alcohol/week: 4.0 standard drinks     Types: 4 Cans of beer per week     Comment: 0-4 cans of beer usually 0    Drug use: Never    Sexual activity: Yes     Partners: Female   Other Topics Concern    Not on file   Social

## 2023-06-24 ENCOUNTER — PATIENT MESSAGE (OUTPATIENT)
Dept: ENDOCRINOLOGY | Age: 38
End: 2023-06-24

## 2023-06-24 DIAGNOSIS — E10.69 TYPE I DIABETES MELLITUS WITH HYPEROSMOLAR COMA (HCC): Primary | ICD-10-CM

## 2023-06-24 DIAGNOSIS — E87.0 TYPE I DIABETES MELLITUS WITH HYPEROSMOLAR COMA (HCC): Primary | ICD-10-CM

## 2023-06-24 DIAGNOSIS — E10.65 TYPE I DIABETES MELLITUS WITH HYPEROSMOLAR COMA (HCC): Primary | ICD-10-CM

## 2023-06-24 LAB
HAV IGM SER IA-ACNC: NONREACTIVE
HEPATITIS B CORE IGM ANTIBODY: NONREACTIVE
HEPATITIS B SURF AG,XHBAGS: NONREACTIVE
HEPATITIS C ANTIBODY: NONREACTIVE
HIV AG/AB: NONREACTIVE

## 2023-06-26 RX ORDER — BLOOD-GLUCOSE SENSOR
EACH MISCELLANEOUS
Qty: 3 EACH | Refills: 0 | Status: SHIPPED | OUTPATIENT
Start: 2023-06-26

## 2023-06-27 ENCOUNTER — TELEPHONE (OUTPATIENT)
Dept: ENDOCRINOLOGY | Age: 38
End: 2023-06-27

## 2023-06-27 LAB — GGT: 612 U/L (ref 8–61)

## 2023-06-28 DIAGNOSIS — R17 JAUNDICE: Primary | ICD-10-CM

## 2023-06-30 ENCOUNTER — HOSPITAL ENCOUNTER (OUTPATIENT)
Dept: ULTRASOUND IMAGING | Age: 38
Discharge: HOME | End: 2023-06-30
Payer: OTHER GOVERNMENT

## 2023-06-30 ENCOUNTER — TELEPHONE (OUTPATIENT)
Dept: FAMILY MEDICINE CLINIC | Age: 38
End: 2023-06-30

## 2023-06-30 DIAGNOSIS — R17 JAUNDICE: ICD-10-CM

## 2023-06-30 PROCEDURE — 76705 ECHO EXAM OF ABDOMEN: CPT

## 2023-07-10 ENCOUNTER — APPOINTMENT (OUTPATIENT)
Dept: GENERAL RADIOLOGY | Age: 38
DRG: 446 | End: 2023-07-10
Payer: OTHER GOVERNMENT

## 2023-07-10 ENCOUNTER — HOSPITAL ENCOUNTER (INPATIENT)
Age: 38
LOS: 1 days | Discharge: HOME | DRG: 446 | End: 2023-07-11
Attending: EMERGENCY MEDICINE | Admitting: FAMILY MEDICINE
Payer: OTHER GOVERNMENT

## 2023-07-10 ENCOUNTER — OFFICE VISIT (OUTPATIENT)
Dept: GASTROENTEROLOGY | Age: 38
End: 2023-07-10
Payer: OTHER GOVERNMENT

## 2023-07-10 ENCOUNTER — ANESTHESIA EVENT (OUTPATIENT)
Dept: ENDOSCOPY | Age: 38
DRG: 446 | End: 2023-07-10
Payer: OTHER GOVERNMENT

## 2023-07-10 ENCOUNTER — ANESTHESIA (OUTPATIENT)
Dept: ENDOSCOPY | Age: 38
DRG: 446 | End: 2023-07-10
Payer: OTHER GOVERNMENT

## 2023-07-10 ENCOUNTER — PREP FOR PROCEDURE (OUTPATIENT)
Dept: GASTROENTEROLOGY | Age: 38
End: 2023-07-10

## 2023-07-10 VITALS
WEIGHT: 231 LBS | SYSTOLIC BLOOD PRESSURE: 122 MMHG | DIASTOLIC BLOOD PRESSURE: 76 MMHG | HEART RATE: 75 BPM | OXYGEN SATURATION: 97 % | BODY MASS INDEX: 29.66 KG/M2

## 2023-07-10 DIAGNOSIS — K80.21 CALCULUS OF GALLBLADDER WITH BILIARY OBSTRUCTION BUT WITHOUT CHOLECYSTITIS: Primary | ICD-10-CM

## 2023-07-10 DIAGNOSIS — R79.89 ABNORMAL LFTS: Primary | ICD-10-CM

## 2023-07-10 DIAGNOSIS — R17 JAUNDICE, NON-NEONATAL: ICD-10-CM

## 2023-07-10 DIAGNOSIS — K83.8 DILATED BILE DUCT: ICD-10-CM

## 2023-07-10 PROBLEM — K83.1 COMMON BILE DUCT (CBD) OBSTRUCTION: Status: ACTIVE | Noted: 2023-07-10

## 2023-07-10 PROBLEM — K80.50 COMMON BILE DUCT STONE: Status: ACTIVE | Noted: 2023-07-10

## 2023-07-10 PROBLEM — E10.65 POOR CONTROL TYPE I DIABETES MELLITUS (HCC): Status: ACTIVE | Noted: 2023-07-10

## 2023-07-10 LAB
ABO + RH BLD: NORMAL
ALBUMIN SERPL-MCNC: 3.9 G/DL (ref 3.5–4.6)
ALP SERPL-CCNC: 577 U/L (ref 35–104)
ALT SERPL-CCNC: 197 U/L (ref 0–41)
ANION GAP SERPL CALCULATED.3IONS-SCNC: 14 MEQ/L (ref 9–15)
APTT PPP: 29.9 SEC (ref 24.4–36.8)
AST SERPL-CCNC: 99 U/L (ref 0–40)
BASOPHILS # BLD: 0 K/UL (ref 0–0.2)
BASOPHILS NFR BLD: 0 %
BILIRUB SERPL-MCNC: 19.5 MG/DL (ref 0.2–0.7)
BLD GP AB SCN SERPL QL: NORMAL
BUN SERPL-MCNC: 13 MG/DL (ref 6–20)
CALCIUM SERPL-MCNC: 9.4 MG/DL (ref 8.5–9.9)
CHLORIDE SERPL-SCNC: 99 MEQ/L (ref 95–107)
CO2 SERPL-SCNC: 21 MEQ/L (ref 20–31)
CREAT SERPL-MCNC: 0.25 MG/DL (ref 0.7–1.2)
EOSINOPHIL # BLD: 0.1 K/UL (ref 0–0.7)
EOSINOPHIL NFR BLD: 1 %
ERYTHROCYTE [DISTWIDTH] IN BLOOD BY AUTOMATED COUNT: 18.1 % (ref 11.5–14.5)
GLOBULIN SER CALC-MCNC: 2.6 G/DL (ref 2.3–3.5)
GLUCOSE BLD-MCNC: 131 MG/DL (ref 70–99)
GLUCOSE SERPL-MCNC: 127 MG/DL (ref 70–99)
HCT VFR BLD AUTO: 40.8 % (ref 42–52)
HGB BLD-MCNC: 13.6 G/DL (ref 14–18)
INR PPP: 1
LIPASE SERPL-CCNC: 1371 U/L (ref 12–95)
LYMPHOCYTES # BLD: 2.2 K/UL (ref 1–4.8)
LYMPHOCYTES NFR BLD: 34 %
MCH RBC QN AUTO: 27.3 PG (ref 27–31.3)
MCHC RBC AUTO-ENTMCNC: 33.3 % (ref 33–37)
MCV RBC AUTO: 82.1 FL (ref 79–92.2)
MONOCYTES # BLD: 0.3 K/UL (ref 0.2–0.8)
MONOCYTES NFR BLD: 6 %
NEUTROPHILS # BLD: 3 K/UL (ref 1.4–6.5)
NEUTS BAND NFR BLD MANUAL: 1 %
NEUTS SEG NFR BLD: 53 %
PERFORMED ON: ABNORMAL
PLATELET # BLD AUTO: 266 K/UL (ref 130–400)
POTASSIUM SERPL-SCNC: 3.3 MEQ/L (ref 3.4–4.9)
PROT SERPL-MCNC: 6.5 G/DL (ref 6.3–8)
PROTHROMBIN TIME: 13.3 SEC (ref 12.3–14.9)
RBC # BLD AUTO: 4.96 M/UL (ref 4.7–6.1)
SODIUM SERPL-SCNC: 134 MEQ/L (ref 135–144)
VARIANT LYMPHS NFR BLD: 5 %
WBC # BLD AUTO: 5.6 K/UL (ref 4.8–10.8)

## 2023-07-10 PROCEDURE — 6370000000 HC RX 637 (ALT 250 FOR IP): Performed by: INTERNAL MEDICINE

## 2023-07-10 PROCEDURE — 80053 COMPREHEN METABOLIC PANEL: CPT

## 2023-07-10 PROCEDURE — 3700000001 HC ADD 15 MINUTES (ANESTHESIA): Performed by: INTERNAL MEDICINE

## 2023-07-10 PROCEDURE — 6370000000 HC RX 637 (ALT 250 FOR IP): Performed by: NURSE PRACTITIONER

## 2023-07-10 PROCEDURE — 7100000011 HC PHASE II RECOVERY - ADDTL 15 MIN: Performed by: INTERNAL MEDICINE

## 2023-07-10 PROCEDURE — 7100000010 HC PHASE II RECOVERY - FIRST 15 MIN: Performed by: INTERNAL MEDICINE

## 2023-07-10 PROCEDURE — 1210000000 HC MED SURG R&B

## 2023-07-10 PROCEDURE — 99222 1ST HOSP IP/OBS MODERATE 55: CPT | Performed by: INTERNAL MEDICINE

## 2023-07-10 PROCEDURE — 85730 THROMBOPLASTIN TIME PARTIAL: CPT

## 2023-07-10 PROCEDURE — 3609018800 HC ERCP DX COLLECTION SPECIMEN BRUSHING/WASHING: Performed by: INTERNAL MEDICINE

## 2023-07-10 PROCEDURE — 86901 BLOOD TYPING SEROLOGIC RH(D): CPT

## 2023-07-10 PROCEDURE — 2580000003 HC RX 258: Performed by: NURSE PRACTITIONER

## 2023-07-10 PROCEDURE — C1769 GUIDE WIRE: HCPCS | Performed by: INTERNAL MEDICINE

## 2023-07-10 PROCEDURE — 2500000003 HC RX 250 WO HCPCS: Performed by: NURSE ANESTHETIST, CERTIFIED REGISTERED

## 2023-07-10 PROCEDURE — 85610 PROTHROMBIN TIME: CPT

## 2023-07-10 PROCEDURE — 3700000000 HC ANESTHESIA ATTENDED CARE: Performed by: INTERNAL MEDICINE

## 2023-07-10 PROCEDURE — 7100000000 HC PACU RECOVERY - FIRST 15 MIN: Performed by: INTERNAL MEDICINE

## 2023-07-10 PROCEDURE — 2580000003 HC RX 258

## 2023-07-10 PROCEDURE — 2580000003 HC RX 258: Performed by: INTERNAL MEDICINE

## 2023-07-10 PROCEDURE — 36415 COLL VENOUS BLD VENIPUNCTURE: CPT

## 2023-07-10 PROCEDURE — 99205 OFFICE O/P NEW HI 60 MIN: CPT | Performed by: INTERNAL MEDICINE

## 2023-07-10 PROCEDURE — 2780000010 HC IMPLANT OTHER: Performed by: INTERNAL MEDICINE

## 2023-07-10 PROCEDURE — 74300 X-RAY BILE DUCTS/PANCREAS: CPT

## 2023-07-10 PROCEDURE — 86900 BLOOD TYPING SEROLOGIC ABO: CPT

## 2023-07-10 PROCEDURE — 99285 EMERGENCY DEPT VISIT HI MDM: CPT

## 2023-07-10 PROCEDURE — 2709999900 HC NON-CHARGEABLE SUPPLY: Performed by: INTERNAL MEDICINE

## 2023-07-10 PROCEDURE — 86850 RBC ANTIBODY SCREEN: CPT

## 2023-07-10 PROCEDURE — 85025 COMPLETE CBC W/AUTO DIFF WBC: CPT

## 2023-07-10 PROCEDURE — 6360000004 HC RX CONTRAST MEDICATION: Performed by: INTERNAL MEDICINE

## 2023-07-10 PROCEDURE — 0F798DZ DILATION OF COMMON BILE DUCT WITH INTRALUMINAL DEVICE, VIA NATURAL OR ARTIFICIAL OPENING ENDOSCOPIC: ICD-10-PCS | Performed by: INTERNAL MEDICINE

## 2023-07-10 PROCEDURE — 7100000001 HC PACU RECOVERY - ADDTL 15 MIN: Performed by: INTERNAL MEDICINE

## 2023-07-10 PROCEDURE — C2625 STENT, NON-COR, TEM W/DEL SY: HCPCS | Performed by: INTERNAL MEDICINE

## 2023-07-10 PROCEDURE — BF111ZZ FLUOROSCOPY OF BILIARY AND PANCREATIC DUCTS USING LOW OSMOLAR CONTRAST: ICD-10-PCS | Performed by: INTERNAL MEDICINE

## 2023-07-10 PROCEDURE — 6360000002 HC RX W HCPCS: Performed by: NURSE ANESTHETIST, CERTIFIED REGISTERED

## 2023-07-10 PROCEDURE — 83690 ASSAY OF LIPASE: CPT

## 2023-07-10 PROCEDURE — 0FC98ZZ EXTIRPATION OF MATTER FROM COMMON BILE DUCT, VIA NATURAL OR ARTIFICIAL OPENING ENDOSCOPIC: ICD-10-PCS | Performed by: INTERNAL MEDICINE

## 2023-07-10 DEVICE — BILIARY STENT WITH NAVIFLEXTM RX DELIVERY SYSTEM
Type: IMPLANTABLE DEVICE | Status: FUNCTIONAL
Brand: ADVANIX™ BILIARY

## 2023-07-10 RX ORDER — SODIUM CHLORIDE, SODIUM LACTATE, POTASSIUM CHLORIDE, CALCIUM CHLORIDE 600; 310; 30; 20 MG/100ML; MG/100ML; MG/100ML; MG/100ML
INJECTION, SOLUTION INTRAVENOUS
Status: COMPLETED
Start: 2023-07-10 | End: 2023-07-10

## 2023-07-10 RX ORDER — ONDANSETRON 2 MG/ML
INJECTION INTRAMUSCULAR; INTRAVENOUS PRN
Status: DISCONTINUED | OUTPATIENT
Start: 2023-07-10 | End: 2023-07-10 | Stop reason: SDUPTHER

## 2023-07-10 RX ORDER — POLYETHYLENE GLYCOL 3350 17 G/17G
17 POWDER, FOR SOLUTION ORAL DAILY PRN
Status: DISCONTINUED | OUTPATIENT
Start: 2023-07-10 | End: 2023-07-11 | Stop reason: HOSPADM

## 2023-07-10 RX ORDER — POTASSIUM CHLORIDE 7.45 MG/ML
10 INJECTION INTRAVENOUS PRN
Status: DISCONTINUED | OUTPATIENT
Start: 2023-07-10 | End: 2023-07-11 | Stop reason: HOSPADM

## 2023-07-10 RX ORDER — SODIUM CHLORIDE, SODIUM LACTATE, POTASSIUM CHLORIDE, CALCIUM CHLORIDE 600; 310; 30; 20 MG/100ML; MG/100ML; MG/100ML; MG/100ML
INJECTION, SOLUTION INTRAVENOUS ONCE
Status: COMPLETED | OUTPATIENT
Start: 2023-07-10 | End: 2023-07-10

## 2023-07-10 RX ORDER — INSULIN LISPRO 100 [IU]/ML
0-4 INJECTION, SOLUTION INTRAVENOUS; SUBCUTANEOUS
Status: DISCONTINUED | OUTPATIENT
Start: 2023-07-10 | End: 2023-07-11 | Stop reason: HOSPADM

## 2023-07-10 RX ORDER — ONDANSETRON 4 MG/1
4 TABLET, ORALLY DISINTEGRATING ORAL EVERY 8 HOURS PRN
Status: DISCONTINUED | OUTPATIENT
Start: 2023-07-10 | End: 2023-07-11 | Stop reason: HOSPADM

## 2023-07-10 RX ORDER — POTASSIUM CHLORIDE 20 MEQ/1
40 TABLET, EXTENDED RELEASE ORAL PRN
Status: DISCONTINUED | OUTPATIENT
Start: 2023-07-10 | End: 2023-07-11 | Stop reason: HOSPADM

## 2023-07-10 RX ORDER — SIMETHICONE 20 MG/.3ML
EMULSION ORAL PRN
Status: DISCONTINUED | OUTPATIENT
Start: 2023-07-10 | End: 2023-07-10 | Stop reason: ALTCHOICE

## 2023-07-10 RX ORDER — MAGNESIUM SULFATE IN WATER 40 MG/ML
2000 INJECTION, SOLUTION INTRAVENOUS PRN
Status: DISCONTINUED | OUTPATIENT
Start: 2023-07-10 | End: 2023-07-11 | Stop reason: HOSPADM

## 2023-07-10 RX ORDER — ROCURONIUM BROMIDE 10 MG/ML
INJECTION, SOLUTION INTRAVENOUS PRN
Status: DISCONTINUED | OUTPATIENT
Start: 2023-07-10 | End: 2023-07-10 | Stop reason: SDUPTHER

## 2023-07-10 RX ORDER — INSULIN GLARGINE 100 [IU]/ML
12 INJECTION, SOLUTION SUBCUTANEOUS EVERY MORNING
Status: DISCONTINUED | OUTPATIENT
Start: 2023-07-11 | End: 2023-07-11

## 2023-07-10 RX ORDER — DEXAMETHASONE SODIUM PHOSPHATE 10 MG/ML
INJECTION INTRAMUSCULAR; INTRAVENOUS PRN
Status: DISCONTINUED | OUTPATIENT
Start: 2023-07-10 | End: 2023-07-10 | Stop reason: SDUPTHER

## 2023-07-10 RX ORDER — PROPOFOL 10 MG/ML
INJECTION, EMULSION INTRAVENOUS PRN
Status: DISCONTINUED | OUTPATIENT
Start: 2023-07-10 | End: 2023-07-10 | Stop reason: SDUPTHER

## 2023-07-10 RX ORDER — ENOXAPARIN SODIUM 100 MG/ML
30 INJECTION SUBCUTANEOUS 2 TIMES DAILY
Status: DISCONTINUED | OUTPATIENT
Start: 2023-07-11 | End: 2023-07-10

## 2023-07-10 RX ORDER — MAGNESIUM HYDROXIDE 1200 MG/15ML
LIQUID ORAL PRN
Status: DISCONTINUED | OUTPATIENT
Start: 2023-07-10 | End: 2023-07-10 | Stop reason: ALTCHOICE

## 2023-07-10 RX ORDER — INSULIN LISPRO 100 [IU]/ML
0-4 INJECTION, SOLUTION INTRAVENOUS; SUBCUTANEOUS NIGHTLY
Status: DISCONTINUED | OUTPATIENT
Start: 2023-07-10 | End: 2023-07-11 | Stop reason: HOSPADM

## 2023-07-10 RX ORDER — SODIUM CHLORIDE 0.9 % (FLUSH) 0.9 %
5-40 SYRINGE (ML) INJECTION PRN
Status: DISCONTINUED | OUTPATIENT
Start: 2023-07-10 | End: 2023-07-11 | Stop reason: HOSPADM

## 2023-07-10 RX ORDER — SODIUM CHLORIDE 9 MG/ML
INJECTION, SOLUTION INTRAVENOUS PRN
Status: DISCONTINUED | OUTPATIENT
Start: 2023-07-10 | End: 2023-07-10 | Stop reason: HOSPADM

## 2023-07-10 RX ORDER — ACETAMINOPHEN 650 MG/1
650 SUPPOSITORY RECTAL EVERY 6 HOURS PRN
Status: DISCONTINUED | OUTPATIENT
Start: 2023-07-10 | End: 2023-07-11 | Stop reason: HOSPADM

## 2023-07-10 RX ORDER — GLUCAGON 1 MG/ML
1 KIT INJECTION PRN
Status: DISCONTINUED | OUTPATIENT
Start: 2023-07-10 | End: 2023-07-11 | Stop reason: HOSPADM

## 2023-07-10 RX ORDER — SUCCINYLCHOLINE/SOD CL,ISO/PF 100 MG/5ML
SYRINGE (ML) INTRAVENOUS PRN
Status: DISCONTINUED | OUTPATIENT
Start: 2023-07-10 | End: 2023-07-10 | Stop reason: SDUPTHER

## 2023-07-10 RX ORDER — ONDANSETRON 2 MG/ML
4 INJECTION INTRAMUSCULAR; INTRAVENOUS EVERY 6 HOURS PRN
Status: DISCONTINUED | OUTPATIENT
Start: 2023-07-10 | End: 2023-07-11 | Stop reason: HOSPADM

## 2023-07-10 RX ORDER — SODIUM CHLORIDE 0.9 % (FLUSH) 0.9 %
5-40 SYRINGE (ML) INJECTION EVERY 12 HOURS SCHEDULED
Status: DISCONTINUED | OUTPATIENT
Start: 2023-07-10 | End: 2023-07-10 | Stop reason: HOSPADM

## 2023-07-10 RX ORDER — INSULIN GLARGINE 100 [IU]/ML
22 INJECTION, SOLUTION SUBCUTANEOUS NIGHTLY
Status: DISCONTINUED | OUTPATIENT
Start: 2023-07-10 | End: 2023-07-11 | Stop reason: HOSPADM

## 2023-07-10 RX ORDER — ACETAMINOPHEN 325 MG/1
650 TABLET ORAL EVERY 6 HOURS PRN
Status: DISCONTINUED | OUTPATIENT
Start: 2023-07-10 | End: 2023-07-11 | Stop reason: HOSPADM

## 2023-07-10 RX ORDER — SODIUM CHLORIDE 0.9 % (FLUSH) 0.9 %
5-40 SYRINGE (ML) INJECTION EVERY 12 HOURS SCHEDULED
Status: DISCONTINUED | OUTPATIENT
Start: 2023-07-10 | End: 2023-07-11 | Stop reason: HOSPADM

## 2023-07-10 RX ORDER — SODIUM CHLORIDE 9 MG/ML
INJECTION, SOLUTION INTRAVENOUS CONTINUOUS
Status: DISCONTINUED | OUTPATIENT
Start: 2023-07-10 | End: 2023-07-11 | Stop reason: HOSPADM

## 2023-07-10 RX ORDER — SODIUM CHLORIDE 0.9 % (FLUSH) 0.9 %
5-40 SYRINGE (ML) INJECTION EVERY 12 HOURS SCHEDULED
Status: CANCELLED | OUTPATIENT
Start: 2023-07-10

## 2023-07-10 RX ORDER — ENOXAPARIN SODIUM 100 MG/ML
30 INJECTION SUBCUTANEOUS DAILY
Status: DISCONTINUED | OUTPATIENT
Start: 2023-07-11 | End: 2023-07-10

## 2023-07-10 RX ORDER — SODIUM CHLORIDE 9 MG/ML
INJECTION, SOLUTION INTRAVENOUS PRN
Status: DISCONTINUED | OUTPATIENT
Start: 2023-07-10 | End: 2023-07-11 | Stop reason: HOSPADM

## 2023-07-10 RX ORDER — SODIUM CHLORIDE 9 MG/ML
INJECTION, SOLUTION INTRAVENOUS CONTINUOUS
Status: CANCELLED | OUTPATIENT
Start: 2023-07-10

## 2023-07-10 RX ORDER — SODIUM CHLORIDE 9 MG/ML
INJECTION, SOLUTION INTRAVENOUS PRN
Status: CANCELLED | OUTPATIENT
Start: 2023-07-10

## 2023-07-10 RX ORDER — ENOXAPARIN SODIUM 100 MG/ML
30 INJECTION SUBCUTANEOUS 2 TIMES DAILY
Status: DISCONTINUED | OUTPATIENT
Start: 2023-07-10 | End: 2023-07-10

## 2023-07-10 RX ORDER — DEXTROSE MONOHYDRATE 100 MG/ML
INJECTION, SOLUTION INTRAVENOUS CONTINUOUS PRN
Status: DISCONTINUED | OUTPATIENT
Start: 2023-07-10 | End: 2023-07-11 | Stop reason: HOSPADM

## 2023-07-10 RX ADMIN — SODIUM CHLORIDE, POTASSIUM CHLORIDE, SODIUM LACTATE AND CALCIUM CHLORIDE: 600; 310; 30; 20 INJECTION, SOLUTION INTRAVENOUS at 12:48

## 2023-07-10 RX ADMIN — Medication 100 MG: at 13:23

## 2023-07-10 RX ADMIN — INSULIN GLARGINE 22 UNITS: 100 INJECTION, SOLUTION SUBCUTANEOUS at 20:53

## 2023-07-10 RX ADMIN — Medication 10 ML: at 20:53

## 2023-07-10 RX ADMIN — ONDANSETRON 4 MG: 2 INJECTION INTRAMUSCULAR; INTRAVENOUS at 13:33

## 2023-07-10 RX ADMIN — SODIUM CHLORIDE, SODIUM LACTATE, POTASSIUM CHLORIDE, CALCIUM CHLORIDE: 600; 310; 30; 20 INJECTION, SOLUTION INTRAVENOUS at 12:48

## 2023-07-10 RX ADMIN — PROPOFOL 200 MG: 10 INJECTION, EMULSION INTRAVENOUS at 13:23

## 2023-07-10 RX ADMIN — SODIUM CHLORIDE: 9 INJECTION, SOLUTION INTRAVENOUS at 20:53

## 2023-07-10 RX ADMIN — SODIUM CHLORIDE: 9 INJECTION, SOLUTION INTRAVENOUS at 12:40

## 2023-07-10 RX ADMIN — POTASSIUM CHLORIDE 40 MEQ: 1500 TABLET, EXTENDED RELEASE ORAL at 17:53

## 2023-07-10 RX ADMIN — DEXAMETHASONE SODIUM PHOSPHATE 10 MG: 10 INJECTION INTRAMUSCULAR; INTRAVENOUS at 13:33

## 2023-07-10 RX ADMIN — SODIUM CHLORIDE: 9 INJECTION, SOLUTION INTRAVENOUS at 13:19

## 2023-07-10 RX ADMIN — ROCURONIUM BROMIDE 30 MG: 10 INJECTION, SOLUTION INTRAVENOUS at 13:23

## 2023-07-10 ASSESSMENT — ENCOUNTER SYMPTOMS
BLOOD IN STOOL: 0
SHORTNESS OF BREATH: 0
DIARRHEA: 0
EYE PAIN: 0
TROUBLE SWALLOWING: 0
ABDOMINAL DISTENTION: 1
ROS SKIN COMMENTS: JAUNDICE
CONSTIPATION: 0
BLOOD IN STOOL: 0
PHOTOPHOBIA: 0
NAUSEA: 0
VOMITING: 0
RECTAL PAIN: 0
WHEEZING: 0
VOMITING: 0
ABDOMINAL PAIN: 1
ABDOMINAL PAIN: 0
VOICE CHANGE: 0
COLOR CHANGE: 0
CHEST TIGHTNESS: 0
EYE REDNESS: 0
BACK PAIN: 0

## 2023-07-10 ASSESSMENT — PAIN - FUNCTIONAL ASSESSMENT
PAIN_FUNCTIONAL_ASSESSMENT: NONE - DENIES PAIN
PAIN_FUNCTIONAL_ASSESSMENT: 0-10

## 2023-07-10 ASSESSMENT — PAIN SCALES - GENERAL
PAINLEVEL_OUTOF10: 0

## 2023-07-10 ASSESSMENT — LIFESTYLE VARIABLES
HOW MANY STANDARD DRINKS CONTAINING ALCOHOL DO YOU HAVE ON A TYPICAL DAY: 1 OR 2
HOW OFTEN DO YOU HAVE A DRINK CONTAINING ALCOHOL: MONTHLY OR LESS

## 2023-07-10 NOTE — CONSULTS
Consults    Patient Name: Georgina Lozano Date: 7/10/2023  9:56 AM  MR #: [id number]  : 1985    Attending Physician: Eleni Sadler MD  Reason for consult: obstructive jaundice    History of Presenting Illness:      Tom Quezada is a 40 y.o. male on hospital day 0 with a history of diabetes, no past surgical history, no family history of GI malignancy social history denies nicotine, reports rare alcohol, no illicit drug use. History Obtained From:  patient, electronic medical record  GI consult for jaundice-patient was sent to the emergency department after being seen in my office for  Abdominal pain associated with jaundice and abnormal LFTs. Symptoms started approximately 2 weeks ago, abdominal pain has improved however he has persistent jaundice, abdominal bloating and significant pruritus, was seen by his PCP was found to have significantly elevated liver enzymes with alkaline phosphatase of 610, bilirubin of 10.7, had follow-up abdominal ultrasound which showed cholelithiasis and dilated CBD of 1 cm. History:      Past Medical History:   Diagnosis Date    DM w/ coma type I, uncontrolled 2021     No past surgical history on file. Family History  Family History   Problem Relation Age of Onset    Heart Attack Father      [] Unable to obtain due to ventilated and/ or neurologic status  Social History     Socioeconomic History    Marital status:      Spouse name: Not on file    Number of children: Not on file    Years of education: Not on file    Highest education level: Not on file   Occupational History    Not on file   Tobacco Use    Smoking status: Never    Smokeless tobacco: Never   Vaping Use    Vaping Use: Never used   Substance and Sexual Activity    Alcohol use:  Yes     Alcohol/week: 4.0 standard drinks     Types: 4 Cans of beer per week     Comment: 0-4 cans of beer usually 0    Drug use: Never    Sexual activity: Yes     Partners: Female   Other Topics Concern    Not on

## 2023-07-10 NOTE — PROGRESS NOTES
DVT / VTE PROPHYLAXIS EVALUATION    Recent Labs     07/10/23  1015   BUN 13   CREATININE 0.25*      HGB 13.6*   HCT 40.8*   INR 1.0     ADMITTING DX OR CHIEF COMPLAINT? CBD  WARFARIN? DOAC'S? no  ANY APPARENT BLEEDING? no  SCHEDULED SURGERY? Post procedure     If yes to following, excluded from auto adjustment in Table 1 of policy - please contact provider with recommendations as appropriate. Include condition/exception in scratch notes. Yes No   Trauma Service or Ortho Surgery []  []    COVID []  []    Pregnancy []  []        Current order:  Enoxaparin 30 mg SUBQ once daily      Height: 6' 2\" (188 cm), Weight - Scale: 231 lb (104.8 kg)  Estimated Creatinine Clearance: 527 mL/min (A) (based on SCr of 0.25 mg/dL (L)).     Plan:  Pharmacologic VTE prophylaxis modified based on patient weight per [facility]/P&T approved protocol     Patient Weight (kg)      50.9 and below .9 101-150.9 151-174.9 175 or greater   Estimated   CrCl  (ml/min) 30 or greater []   30 mg   SUBQ daily   []   40 mg   SUBQ daily (or 30 mg BID for orthopedic cases) [x]  30 mg SUBQ   BID  []  40 mg   SUBQ   BID []  60mg SUBQ BID    15-29.9 []  UFH 5000   units SUBQ BID []  30 mg   SUBQ daily [] 30 mg SUBQ   daily []  40 mg SUBQ   daily [] 60 mg SUBQ   daily    Less than 15 or dialysis []  UFH 5000   units SUBQ BID [] UFH 5000 units SUBQ TID []  UFH 7500   units   SUBQ TID

## 2023-07-10 NOTE — ANESTHESIA POSTPROCEDURE EVALUATION
Department of Anesthesiology  Postprocedure Note    Patient: Valeriy Taylor  MRN: [id number]  YOB: 1985  Date of evaluation: 7/10/2023      Procedure Summary     Date: 07/10/23 Room / Location: [address] [place] [address] / [address] [place] [address]    Anesthesia Start: 1319 Anesthesia Stop: 7470    Procedure: ERCP DIAGNOSTIC Diagnosis:       Obstructive jaundice      (Obstructive jaundice [K83.1])    Surgeons: Mehran Hernandez MD Responsible Provider: FROILAN Owens CRNA    Anesthesia Type: general ASA Status: 2          Anesthesia Type: No value filed.     Korin Phase I: Korin Score: 7    Korin Phase II:        Anesthesia Post Evaluation    Patient location during evaluation: bedside  Patient participation: complete - patient participated  Level of consciousness: awake and awake and alert  Pain score: 0  Airway patency: patent  Nausea & Vomiting: no nausea and no vomiting  Complications: no  Cardiovascular status: blood pressure returned to baseline and hemodynamically stable  Respiratory status: acceptable  Hydration status: euvolemic

## 2023-07-10 NOTE — ANESTHESIA PRE PROCEDURE
Department of Anesthesiology  Preprocedure Note       Name:  Ed Gee   Age:  40 y.o.  :  1985                                          MRN:  [id number]         Date:  7/10/2023      Surgeon: Jonatan Altamirano):  Sylvester Montalvo MD    Procedure: Procedure(s):  ERCP DIAGNOSTIC    Medications prior to admission:   Prior to Admission medications    Medication Sig Start Date End Date Taking? Authorizing Provider   Continuous Blood Gluc Sensor (DEXCOM G7 SENSOR) MISC Change every 10 days 23   Nacho Pearson MD   Insulin Regular Human (AFREZZA) 60x4 &60x8 & 60x12 UNIT POWD INHALE 8-24 UNITS BY MOUTH AT MEALTIME AND ADDITIONAL FOR GLUCOSE CONTROL (MAX DAILY DOSE 120 UNITS) 6/15/23   Wilton Gutierrez MD   BASAGLAR KWIKPEN 100 UNIT/ML injection pen INJECT 12 UNITS SUBUTANEOUSLY IN THE MORNING AND INJECT 22 UNITS SUBUTANEOUSLY IN THE EVENING. 23   MD GUERITA Rubalcava 60x4 &60x8 & 60x12 UNIT POWD INHALE 8 TO 24 UNITS BY MOUTH AT MEALTIME AND ADDITIONAL FOR GLUCOSE CONTROL.  MAXIMUM DAILY DOSE 120 UNITS 23   Nacho Pearson MD   Insulin Pen Needle 32G X 4 MM MISC qid 23   Nacho Pearson MD   Insulin Regular Human [facility]) 60x4 &60x8 & 60x12 UNIT POWD Lot [id number] Exp 10/22 2 boxes were given  Patient not taking: Reported on 7/10/2023 7/28/22   Nacho Pearson MD   Insulin Regular Human 60x4 &60x8 & 60x12 UNIT POWD Take 16 units  Am 8 units lunch and 16 units dinner  Patient not taking: Reported on 7/10/2023 7/15/22   Nacho Pearson MD   insulin aspart (NOVOLOG FLEXPEN) 100 UNIT/ML injection pen 8 to units with each meals  Patient not taking: Reported on 7/10/2023 1/6/22   Nacho Pearson MD   Insulin Pen Needle (RELION PEN NEEDLES) 31G X 6 MM MISC Use 5 daily with insulin 21   Nacho Pearson MD   Insulin Regular Human 4 & 8 & 12 units POWD Take 16 units  Am 8 units lunch and 16 units dinner  Patient not taking: Reported on 7/10/2023 7/8/21   Nacho Pearson MD   blood glucose test strips (ACCU-CHEK GUIDE) strip Pt test 4x daily

## 2023-07-10 NOTE — PROGRESS NOTES
well-developed. HENT:      Head: Normocephalic and atraumatic. Eyes:      General: Scleral icterus present. Conjunctiva/sclera: Conjunctivae normal.      Pupils: Pupils are equal, round, and reactive to light. Cardiovascular:      Rate and Rhythm: Normal rate and regular rhythm. Heart sounds: Normal heart sounds. Pulmonary:      Effort: Pulmonary effort is normal. No respiratory distress. Breath sounds: Normal breath sounds. No wheezing or rales. Abdominal:      General: Bowel sounds are normal. There is no distension. Palpations: Abdomen is soft. Abdomen is not rigid. There is no hepatomegaly, splenomegaly or mass. Tenderness: There is no abdominal tenderness. There is no guarding or rebound. Musculoskeletal:         General: No tenderness or deformity. Normal range of motion. Cervical back: Neck supple. Skin:     Coloration: Skin is not pale. Findings: No erythema or rash. Neurological:      Mental Status: He is alert and oriented to person, place, and time.        Laboratory, Pathology, Radiology reviewed in detail with relevantimportant investigations summarized below:  Lab Results   Component Value Date/Time    WBC 24.9 11/17/2022 12:30 PM    WBC 5.3 05/19/2022 09:34 AM    HGB 19.0 11/17/2022 02:33 PM    HGB 14.8 11/17/2022 12:30 PM    HGB 15.0 05/19/2022 09:34 AM    HCT 44.1 11/17/2022 12:30 PM    HCT 43.7 05/19/2022 09:34 AM    MCV 82.8 11/17/2022 12:30 PM    MCV 80.1 05/19/2022 09:34 AM     11/17/2022 12:30 PM     05/19/2022 09:34 AM    .  Lab Results   Component Value Date/Time     06/23/2023 06:49 PM    ALT 17 11/17/2022 12:30 PM    ALT 21 05/19/2022 09:34 AM     06/23/2023 06:49 PM    AST 22 11/17/2022 12:30 PM    AST 21 05/19/2022 09:34 AM    ALKPHOS 638 06/23/2023 06:49 PM    ALKPHOS 86 11/17/2022 12:30 PM    ALKPHOS 103 05/19/2022 09:34 AM    BILITOT 10.7 06/23/2023 06:49 PM    BILITOT 1.4 11/17/2022 12:30 PM    BILITOT 0.4

## 2023-07-10 NOTE — ED PROVIDER NOTES
edema.   Skin:     Capillary Refill: Capillary refill takes less than 2 seconds. Coloration: Skin is jaundiced. Findings: No rash. Neurological:      Mental Status: He is alert and oriented to person, place, and time. Psychiatric:         Mood and Affect: Mood normal.       DIAGNOSTIC RESULTS     EKG: All EKG's are interpreted by the Emergency Department Physician who either signs or Co-signs this chart in the absence of a cardiologist.    RADIOLOGY:   Non-plain film images such as CT, Ultrasound and MRI are read by the radiologist. Plain radiographic images are visualized and preliminarily interpreted by the emergency physician with the below findings:    Interpretation per the Radiologist below, if available at the time of this note:    FL CHOLANGIOGRAM OR    (Results Pending)         ED BEDSIDE ULTRASOUND:   Performed by ED Physician - none    LABS:  Labs Reviewed   CBC WITH AUTO DIFFERENTIAL - Abnormal; Notable for the following components:       Result Value    Hemoglobin 13.6 (*)     Hematocrit 40.8 (*)     RDW 18.1 (*)     All other components within normal limits   COMPREHENSIVE METABOLIC PANEL - Abnormal; Notable for the following components:    Sodium 134 (*)     Potassium 3.3 (*)     Glucose 127 (*)     Creatinine 0.25 (*)     Total Bilirubin 19.5 (*)     Alkaline Phosphatase 577 (*)      (*)     AST 99 (*)     All other components within normal limits   LIPASE - Abnormal; Notable for the following components:    Lipase 1,371 (*)     All other components within normal limits   PROTIME-INR   APTT   TYPE AND SCREEN       All other labs were within normal range or not returned as of this dictation.     EMERGENCY DEPARTMENT COURSE and DIFFERENTIAL DIAGNOSIS/MDM:   Vitals:    Vitals:    07/10/23 1115 07/10/23 1130 07/10/23 1145 07/10/23 1200   BP:       Pulse:       Resp:       Temp:       SpO2: 96% 100% 100% 100%   Weight:       Height:             No leukocytosis  Elevated bili

## 2023-07-10 NOTE — H&P
HISTORY AND PHYSICAL             Date: 7/10/2023        Patient Name: Terrace Jeans     YOB: 1985      Age:  40 y.o. Chief Complaint     Chief Complaint   Patient presents with    Other     Dr. Katie Fowler sent pt here for ERCP      History Obtained From   patient, electronic medical record    History of Present Illness   The patient is a 36-year-old  male with significant past medical history of DM type I, who was admitted for cholelithiasis and dilatation of the CBD. Patient was by his PCP on 06/23/23 for complaints of RUQ abdominal pain x 1 week, dark colored urine, and jaundice. Patient was found to have elevated LFTs and US of abdomen revealed cholelithiasis and combined intrahepatic and extrahepatic biliary dilatation in keeping with biliary obstruction/obstructive jaundice. Patient had an emergent ERCP under the service of Dr. Liborio Hercules. At time of examination, patient denies abdominal pain, chest pain, shortness of breath, and N/V. Past Medical History     Past Medical History:   Diagnosis Date    DM w/ coma type I, uncontrolled 6/11/2021      Past Surgical History     Past Surgical History:   Procedure Laterality Date    CATARACT EXTRACTION Bilateral 2022    VASECTOMY  2022      Medications Prior to Admission     Prior to Admission medications    Medication Sig Start Date End Date Taking? Authorizing Provider   Continuous Blood Gluc Sensor (DEXCOM G7 SENSOR) MISC Change every 10 days 6/26/23   Cait Maldonado MD   Insulin Regular Human (AFREZZA) 60x4 &60x8 & 60x12 UNIT POWD INHALE 8-24 UNITS BY MOUTH AT MEALTIME AND ADDITIONAL FOR GLUCOSE CONTROL (MAX DAILY DOSE 120 UNITS) 6/15/23   MD CHANTALE Ramsey 100 UNIT/ML injection pen INJECT 12 UNITS SUBUTANEOUSLY IN THE MORNING AND INJECT 22 UNITS SUBUTANEOUSLY IN THE EVENING. 6/13/23   Cait Maldonado MD   AFREZZA 60x4 &60x8 & 60x12 UNIT POWD INHALE 8 TO 24 UNITS BY MOUTH AT MEALTIME AND ADDITIONAL FOR GLUCOSE CONTROL.  MAXIMUM

## 2023-07-11 ENCOUNTER — APPOINTMENT (OUTPATIENT)
Dept: CT IMAGING | Age: 38
DRG: 446 | End: 2023-07-11
Payer: OTHER GOVERNMENT

## 2023-07-11 VITALS
DIASTOLIC BLOOD PRESSURE: 81 MMHG | OXYGEN SATURATION: 95 % | HEIGHT: 74 IN | HEART RATE: 70 BPM | WEIGHT: 231 LBS | BODY MASS INDEX: 29.65 KG/M2 | TEMPERATURE: 98.3 F | SYSTOLIC BLOOD PRESSURE: 150 MMHG | RESPIRATION RATE: 18 BRPM

## 2023-07-11 LAB
ALBUMIN SERPL-MCNC: 3.3 G/DL (ref 3.5–4.6)
ALP SERPL-CCNC: 519 U/L (ref 35–104)
ALT SERPL-CCNC: 142 U/L (ref 0–41)
ANION GAP SERPL CALCULATED.3IONS-SCNC: 10 MEQ/L (ref 9–15)
ANISOCYTOSIS BLD QL SMEAR: ABNORMAL
AST SERPL-CCNC: 60 U/L (ref 0–40)
BASOPHILS # BLD: 0 K/UL (ref 0–0.2)
BASOPHILS NFR BLD: 0 %
BILIRUB DIRECT SERPL-MCNC: 7.4 MG/DL (ref 0–0.4)
BILIRUB INDIRECT SERPL-MCNC: 3.4 MG/DL (ref 0–0.6)
BILIRUB SERPL-MCNC: 10.8 MG/DL (ref 0.2–0.7)
BUN SERPL-MCNC: 15 MG/DL (ref 6–20)
CALCIUM SERPL-MCNC: 8.7 MG/DL (ref 8.5–9.9)
CHLORIDE SERPL-SCNC: 101 MEQ/L (ref 95–107)
CO2 SERPL-SCNC: 20 MEQ/L (ref 20–31)
CREAT SERPL-MCNC: 0.46 MG/DL (ref 0.7–1.2)
EOSINOPHIL # BLD: 0 K/UL (ref 0–0.7)
EOSINOPHIL NFR BLD: 0 %
ERYTHROCYTE [DISTWIDTH] IN BLOOD BY AUTOMATED COUNT: 18.1 % (ref 11.5–14.5)
GLUCOSE BLD-MCNC: 148 MG/DL (ref 70–99)
GLUCOSE SERPL-MCNC: 274 MG/DL (ref 70–99)
HCT VFR BLD AUTO: 36.8 % (ref 42–52)
HGB BLD-MCNC: 12.4 G/DL (ref 14–18)
LYMPHOCYTES # BLD: 0.9 K/UL (ref 1–4.8)
LYMPHOCYTES NFR BLD: 11 %
MCH RBC QN AUTO: 27.9 PG (ref 27–31.3)
MCHC RBC AUTO-ENTMCNC: 33.8 % (ref 33–37)
MCV RBC AUTO: 82.5 FL (ref 79–92.2)
MONOCYTES # BLD: 0.4 K/UL (ref 0.2–0.8)
MONOCYTES NFR BLD: 5 %
NEUTROPHILS # BLD: 6.9 K/UL (ref 1.4–6.5)
NEUTS SEG NFR BLD: 84 %
PERFORMED ON: ABNORMAL
PLATELET # BLD AUTO: 240 K/UL (ref 130–400)
PLATELET BLD QL SMEAR: ADEQUATE
POTASSIUM SERPL-SCNC: 3.7 MEQ/L (ref 3.4–4.9)
PROT SERPL-MCNC: 5.7 G/DL (ref 6.3–8)
RBC # BLD AUTO: 4.46 M/UL (ref 4.7–6.1)
SODIUM SERPL-SCNC: 131 MEQ/L (ref 135–144)
STOMATOCYTES BLD QL SMEAR: ABNORMAL
WBC # BLD AUTO: 8.2 K/UL (ref 4.8–10.8)

## 2023-07-11 PROCEDURE — 80048 BASIC METABOLIC PNL TOTAL CA: CPT

## 2023-07-11 PROCEDURE — 6360000004 HC RX CONTRAST MEDICATION: Performed by: NURSE PRACTITIONER

## 2023-07-11 PROCEDURE — 85025 COMPLETE CBC W/AUTO DIFF WBC: CPT

## 2023-07-11 PROCEDURE — 6370000000 HC RX 637 (ALT 250 FOR IP): Performed by: NURSE PRACTITIONER

## 2023-07-11 PROCEDURE — 36415 COLL VENOUS BLD VENIPUNCTURE: CPT

## 2023-07-11 PROCEDURE — 2700000000 HC OXYGEN THERAPY PER DAY

## 2023-07-11 PROCEDURE — 74177 CT ABD & PELVIS W/CONTRAST: CPT

## 2023-07-11 PROCEDURE — 99232 SBSQ HOSP IP/OBS MODERATE 35: CPT | Performed by: NURSE PRACTITIONER

## 2023-07-11 PROCEDURE — 6370000000 HC RX 637 (ALT 250 FOR IP): Performed by: FAMILY MEDICINE

## 2023-07-11 PROCEDURE — 80076 HEPATIC FUNCTION PANEL: CPT

## 2023-07-11 RX ORDER — INSULIN GLARGINE 100 [IU]/ML
18 INJECTION, SOLUTION SUBCUTANEOUS EVERY MORNING
Status: DISCONTINUED | OUTPATIENT
Start: 2023-07-12 | End: 2023-07-11 | Stop reason: HOSPADM

## 2023-07-11 RX ORDER — INSULIN GLARGINE 100 [IU]/ML
6 INJECTION, SOLUTION SUBCUTANEOUS ONCE
Status: COMPLETED | OUTPATIENT
Start: 2023-07-11 | End: 2023-07-11

## 2023-07-11 RX ADMIN — IOPAMIDOL 50 ML: 612 INJECTION, SOLUTION INTRAVENOUS at 11:17

## 2023-07-11 RX ADMIN — INSULIN GLARGINE 12 UNITS: 100 INJECTION, SOLUTION SUBCUTANEOUS at 08:29

## 2023-07-11 RX ADMIN — INSULIN GLARGINE 6 UNITS: 100 INJECTION, SOLUTION SUBCUTANEOUS at 10:02

## 2023-07-11 NOTE — CONSULTS
[facility] [place], [address] [facility]                                  CONSULTATION    PATIENT NAME: Dudley Leyden                  :        1985  MED REC NO:   [id number]                            ROOM:       [id number]  ACCOUNT NO:   [de-identified]                           ADMIT DATE: 07/10/2023  PROVIDER:     Lindsay Malhotra MD    CONSULT DATE:  07/10/2023    ENDOCRINE CONSULTATION    REFERRING PROVIDER:  FROILAN Rowley CNP    REASON FOR CONSULTATION:  Management of type 1 diabetes. CHIEF COMPLAINT AND HISTORY OF PRESENT ILLNESS:  The patient is a  79-year-old male known with history of type 1 diabetes with good control  admitted to [facility] because of elective ERCP for  cholelithiasis and dilation of the common bile duct. The patient had  symptoms for a week with increasing jaundice, dark-colored urine, right  upper quadrant abdominal pain. Gallbladder ultrasound reveals  cholelithiasis with intrahepatic and extrahepatic biliary dilatation. The patient is status post ERCP procedure done. Blood sugars here are  under good control. The patient is using continuous glucose monitoring  to test his sugars and intake inhaled insulin Afrezza. He is on Lantus  12 units in the morning and 22 at night. The patient has been on  Lantus/Basaglar twice daily plus of Afrezza/NovoLog at home. Overall,  his control has been excellent over the last couple of years. A1c is  fluctuating between 6.2 to 7.7, most current A1c was 8.5. Chemistries done here. Sodium was 134, potassium 3.3, chloride 99, CO2  was 21, BUN 13, creatinine 0.25, alkaline phosphatase was 577, ALT was  197, AST was 99. Lipase was 1371. Bilirubin was 8.2. WBC count 5.6,  hemoglobin was 13.6. PAST MEDICAL HISTORY:  Significant for type 1 diabetes. PAST SURGICAL HISTORY:  Cataract surgery, ERCP done today, vasectomy.     FAMILY HISTORY:

## 2023-07-11 NOTE — DISCHARGE SUMMARY
visit. Portable: Results for orders placed during the hospital encounter of 11/17/22    XR CHEST PORTABLE    Narrative  EXAMINATION:  ONE XRAY VIEW OF THE CHEST    11/17/2022 2:44 pm    COMPARISON:  None. HISTORY:  ORDERING SYSTEM PROVIDED HISTORY: elevated WBC  TECHNOLOGIST PROVIDED HISTORY: Leukocytosis  Reason for exam:->elevated WBC  What reading provider will be dictating this exam?->CRC    FINDINGS:  The cardiac silhouette is enlarged. 9 point vasculature unremarkable. Right-sided trachea. Area of atelectasis versus patchy ground-glass infiltrate left lower lobe    No effusion. No pneumothorax. Note is made of elevation right hemidiaphragm    Impression  Area of atelectasis versus patchy ground-glass infiltrate left lower lobe      Echo No results found for this or any previous visit. Disposition: {disposition:79153}    In process/preliminary results:  Outstanding Order Results       Date and Time Order Name Status Description    7/11/2023 11:16 AM CT ABDOMEN PELVIS W IV CONTRAST Additional Contrast? None In process             Patient Instructions:   Current Discharge Medication List        CONTINUE these medications which have NOT CHANGED    Details   !! Continuous Blood Gluc Sensor (DEXCOM G7 SENSOR) MISC Change every 10 days  Qty: 3 each, Refills: 0    Associated Diagnoses: Type I diabetes mellitus with hyperosmolar coma ([address])      ! ! Insulin Regular Human (AFREZZA) 60x4 &60x8 & 60x12 UNIT POWD INHALE 8-24 UNITS BY MOUTH AT MEALTIME AND ADDITIONAL FOR GLUCOSE CONTROL (MAX DAILY DOSE 120 UNITS)  Qty: 360 each, Refills: 3      BASAGLAR KWIKPEN 100 UNIT/ML injection pen INJECT 12 UNITS SUBUTANEOUSLY IN THE MORNING AND INJECT 22 UNITS SUBUTANEOUSLY IN THE EVENING. Qty: 15 mL, Refills: 3      !! AFREZZA 60x4 &60x8 & 60x12 UNIT POWD INHALE 8 TO 24 UNITS BY MOUTH AT MEALTIME AND ADDITIONAL FOR GLUCOSE CONTROL. MAXIMUM DAILY DOSE 120 UNITS  Qty: 360 each, Refills: 3      !!  Insulin Pen

## 2023-07-11 NOTE — CARE COORDINATION
Case Management Assessment  Initial Evaluation    Date/Time of Evaluation: 2023 10:22 AM  Assessment Completed by: Anshul Armstrong RN    If patient is discharged prior to next notation, then this note serves as note for discharge by case management. Patient Name: Valentina Brown                   YOB: 1985  Diagnosis: Common bile duct (CBD) obstruction [K83.1]  Jaundice, non- [R17]  Dilated bile duct [K83.8]  Calculus of gallbladder with biliary obstruction but without cholecystitis [K80.21]                   Date / Time: 7/10/2023  9:56 AM    Patient Admission Status: Inpatient   Readmission Risk (Low < 19, Mod (19-27), High > 27): Readmission Risk Score: 10.5    Current PCP: Neftaly Ortez MD  PCP verified by CM? Yes    Chart Reviewed: Yes      History Provided by: Patient  Patient Orientation: Alert and Oriented    Patient Cognition: Alert    Hospitalization in the last 30 days (Readmission):  No    If yes, Readmission Assessment in CM Navigator will be completed. Advance Directives:      Code Status: Full Code   Patient's Primary Decision Maker is: Named in Scanned ACP Document      Discharge Planning:    Patient lives with: Spouse/Significant Other Type of Home: House  Primary Care Giver: Self  Patient Support Systems include: Spouse/Significant Other   Current Financial resources: Other (Comment) (COMMERCIAL)  Current community resources: None  Current services prior to admission: None            Current DME:  NONE            Type of Home Care services:  None    ADLS  Prior functional level: Independent in ADLs/IADLs  Current functional level: Independent in ADLs/IADLs    PT AM-PAC:     OT AM-PAC:       Family can provide assistance at DC: Yes  Would you like Case Management to discuss the discharge plan with any other family members/significant others, and if so, who?  Yes (SPOUSE)  Plans to Return to Present Housing: Yes  Other Identified Issues/Barriers to

## 2023-07-11 NOTE — PLAN OF CARE
Problem: Chronic Conditions and Co-morbidities  Goal: Patient's chronic conditions and co-morbidity symptoms are monitored and maintained or improved  7/11/2023 1447 by Haris Longoria RN  Outcome: Adequate for Discharge  7/11/2023 0048 by Kwame Guerrero RN  Outcome: Progressing     Problem: Pain  Goal: Verbalizes/displays adequate comfort level or baseline comfort level  7/11/2023 1447 by Haris Longoria RN  Outcome: Adequate for Discharge  7/11/2023 0048 by Kwame Guerrero RN  Outcome: Progressing     Problem: Discharge Planning  Goal: Discharge to home or other facility with appropriate resources  7/11/2023 1447 by Haris Longoria RN  Outcome: Adequate for Discharge  7/11/2023 0048 by Kwame Guerrero RN  Outcome: Progressing  [address] (Taken 7/10/2023 1834 by Rosamaria Huertas RN)  Discharge to home or other facility with appropriate resources: Identify barriers to discharge with patient and caregiver     Problem: ABCDS Injury Assessment  Goal: Absence of physical injury  7/11/2023 1447 by Haris Longoria RN  Outcome: Adequate for Discharge  7/11/2023 0048 by Kwame Guerrero RN  Outcome: Progressing

## 2023-07-11 NOTE — PROGRESS NOTES
0840 Patient resting in bed. No complaints of pain and discomfort. Spoke to patient about his blood sugar. Perfect served Dr. Renetta Carrion due to a change needed in his lantus. New orders to follow. No further needs. Call light in reach. 1115 Patient off floor for CT scan    1145 Patient returned to floor. [facility] to [place] NP that gave the ok to discharge from Dr Joseph Canut. 1445 Patient given discharge instructions with no questions. All belongings taken with patient.

## 2023-07-12 ENCOUNTER — TELEPHONE (OUTPATIENT)
Dept: FAMILY MEDICINE CLINIC | Age: 38
End: 2023-07-12

## 2023-07-12 NOTE — TELEPHONE ENCOUNTER
Care Transitions Initial Follow Up Call    Outreach made within 2 business days of discharge: Yes    Patient: Meghan Ventura Patient : 1985   MRN: [id number]  Reason for Admission: There are no discharge diagnoses documented for the most recent discharge.   Discharge Date: 23       Spoke with: Pt was called, left message to call back    Discharge department/facility: [facility]      Scheduled appointment with PCP within 7-14 days    Follow Up  Future Appointments   Date Time Provider [address] [place] [address]   2023  3:00 PM Arturo Gibbs MD [address] [address]   2023 11:30 AM Luther Phillips MD [address] [facility]   2023  9:30 AM Osvaldo Jarrett MD [address] [place] [address]   2024 12:00 PM Bonnie Alvarenga MD [facility]   2024  1:00 PM Nelson Citizen, [address] [place] [address]

## 2023-07-17 ENCOUNTER — OFFICE VISIT (OUTPATIENT)
Dept: FAMILY MEDICINE CLINIC | Age: 38
End: 2023-07-17
Payer: OTHER GOVERNMENT

## 2023-07-17 VITALS
BODY MASS INDEX: 29.15 KG/M2 | DIASTOLIC BLOOD PRESSURE: 70 MMHG | RESPIRATION RATE: 15 BRPM | OXYGEN SATURATION: 97 % | WEIGHT: 227 LBS | HEART RATE: 90 BPM | SYSTOLIC BLOOD PRESSURE: 112 MMHG

## 2023-07-17 DIAGNOSIS — E56.9 VITAMIN DEFICIENCY: ICD-10-CM

## 2023-07-17 DIAGNOSIS — Z09 HOSPITAL DISCHARGE FOLLOW-UP: ICD-10-CM

## 2023-07-17 DIAGNOSIS — R17 JAUNDICE, NON-NEONATAL: ICD-10-CM

## 2023-07-17 DIAGNOSIS — K83.1 COMMON BILE DUCT (CBD) OBSTRUCTION: ICD-10-CM

## 2023-07-17 DIAGNOSIS — K83.1 COMMON BILE DUCT (CBD) OBSTRUCTION: Primary | ICD-10-CM

## 2023-07-17 LAB
ALBUMIN SERPL-MCNC: 4.2 G/DL (ref 3.5–4.6)
ALP SERPL-CCNC: 355 U/L (ref 35–104)
ALT SERPL-CCNC: 505 U/L (ref 0–41)
AST SERPL-CCNC: 276 U/L (ref 0–40)
BILIRUB DIRECT SERPL-MCNC: 3.7 MG/DL (ref 0–0.4)
BILIRUB INDIRECT SERPL-MCNC: 2.9 MG/DL (ref 0–0.6)
BILIRUB SERPL-MCNC: 6.6 MG/DL (ref 0.2–0.7)
LIPASE SERPL-CCNC: 60 U/L (ref 12–95)
PROT SERPL-MCNC: 6.8 G/DL (ref 6.3–8)

## 2023-07-17 PROCEDURE — 99214 OFFICE O/P EST MOD 30 MIN: CPT | Performed by: FAMILY MEDICINE

## 2023-07-17 PROCEDURE — 36415 COLL VENOUS BLD VENIPUNCTURE: CPT | Performed by: FAMILY MEDICINE

## 2023-07-17 PROCEDURE — 1111F DSCHRG MED/CURRENT MED MERGE: CPT | Performed by: FAMILY MEDICINE

## 2023-07-17 ASSESSMENT — ENCOUNTER SYMPTOMS
WHEEZING: 0
ABDOMINAL PAIN: 0
RHINORRHEA: 0
COLOR CHANGE: 1
SHORTNESS OF BREATH: 0
CONSTIPATION: 0
SORE THROAT: 0
DIARRHEA: 0
COUGH: 0

## 2023-07-17 NOTE — PROGRESS NOTES
[address] PRIMARY CARE  [place] [address]  Dept: [phone]  Dept Fax: [phone]: [phone]     Chief Complaint  Chief Complaint   Patient presents with    Follow-Up from [facility] on 7/10-7/11/2023. Had gallstones removed        HPI:  40 y.o.male who presents for the following:      Found to have jaundice and cholestatic LFT pattern; seen by GI and had ERCP with stone removal and sphincterotomy with stenting; there are plans for repeat soon as well as elective removal of gallbladder though patient does now want to have gallbladder removed; Jaundice is improving but still visible    Both parents have DM2 and he has DM1; would like to check for  vitamin deficiency    Review of Systems   Constitutional:  Negative for chills and fever. HENT:  Negative for congestion, rhinorrhea and sore throat. Respiratory:  Negative for cough, shortness of breath and wheezing. Gastrointestinal:  Negative for abdominal pain, constipation and diarrhea. Endocrine: Negative for polydipsia and polyuria. Genitourinary:  Negative for dysuria, frequency and urgency. Skin:  Positive for color change. Neurological:  Negative for syncope, light-headedness, numbness and headaches. Psychiatric/Behavioral:  Negative for sleep disturbance. The patient is not nervous/anxious.       Past Medical History:   Diagnosis Date    DM w/ coma type I, uncontrolled 6/11/2021     Past Surgical History:   Procedure Laterality Date    CATARACT EXTRACTION Bilateral 2022    ERCP N/A 7/10/2023    ERCP DIAGNOSTIC performed by Roby Daniels MD at [address] [place] [address]  2022     Social History     Socioeconomic History    Marital status:      Spouse name: Not on file    Number of children: Not on file    Years of education: Not on file    Highest education level: Not on file   Occupational History    Not on file

## 2023-07-18 ENCOUNTER — PATIENT MESSAGE (OUTPATIENT)
Dept: GASTROENTEROLOGY | Age: 38
End: 2023-07-18

## 2023-07-18 DIAGNOSIS — K81.0 ACUTE CHOLECYSTITIS: Primary | ICD-10-CM

## 2023-07-18 LAB
VITAMIN B-12: 830 PG/ML (ref 232–1245)
VITAMIN D 25-HYDROXY: 32.9 NG/ML

## 2023-07-19 DIAGNOSIS — E10.69 TYPE I DIABETES MELLITUS WITH HYPEROSMOLAR COMA (HCC): Primary | ICD-10-CM

## 2023-07-19 DIAGNOSIS — E87.0 TYPE I DIABETES MELLITUS WITH HYPEROSMOLAR COMA (HCC): Primary | ICD-10-CM

## 2023-07-19 DIAGNOSIS — E10.65 TYPE I DIABETES MELLITUS WITH HYPEROSMOLAR COMA (HCC): Primary | ICD-10-CM

## 2023-07-19 NOTE — TELEPHONE ENCOUNTER
From: Gisella Pages  To: Dr. Laverne Lorenzo: 7/18/2023 7:07 PM EDT  Subject: Lab Results    I followed up with my primary care and had new labs pulled. The results show my ALP and bilirubin high but coming down, AST ALT have increased. I still have slight jaundice mostly in the eyes. Itching has almost gone away. The CT scan showed possible fatty liver. What should I be doing to lower these enzymes?     Thanks

## 2023-07-20 NOTE — PROGRESS NOTES
GENERAL SURGERY  NEW PATIENT HISTORY AND PHYSICAL NOTE    Pt Name: Dottie Daigle  MRN: [id number]    Date: 7/21/2023    Primary Care Physician: Markus Barnes MD  Referring Provider: Maged Montes NP    Reason for evaluation: Cholecystectomy      SUBJECTIVE:     History of Chief Complaint:    Amy Reyes is a 40 y.o. male with a PMH of T1DM who was recently hospitalized with choledocholithiasis (s/p ERCP with sphincterotomy and balloon stone extraction, CBD plastic stent placement by Dr Billie Reyes on 7/10/23) who presents for consideration of a cholecystectomy. He reports developing T1DM in 2021 and lost a significant amount of weight in a short period of time (100 pounds in 2 months). Recently, he noticed some abdominal pain in the setting of jaundice and dark urine. He was seen by GI, found to have choledocholithiasis requiring ERCP. Upon presentation today, he is feeling much better, asymptomatic and tolerating a diet. He notes his jaundice is improving and his urine is still a little darker. Blood sugars are better controlled, running in the 140s. He has an appointment with a naturopath on 7/31 and is interested in alternative nonoperative approaches to his gallstones. Past Medical History:   Diagnosis Date    DM w/ coma type I, uncontrolled 6/11/2021     Past Surgical History:   Procedure Laterality Date    CATARACT EXTRACTION Bilateral 2022    ERCP N/A 7/10/2023    ERCP DIAGNOSTIC performed by Diamante Pinot MD at [address] [place] [address]  2022     Prior to Admission medications    Medication Sig Start Date End Date Taking?  Authorizing Provider   Continuous Blood Gluc Sensor (DEXCOM G7 SENSOR) MISC Change every 10 days 6/26/23   Alex Navas MD   Insulin Regular Human (AFREZZA) 60x4 &60x8 & 60x12 UNIT POWD INHALE 8-24 UNITS BY MOUTH AT MEALTIME AND ADDITIONAL FOR GLUCOSE CONTROL (MAX DAILY DOSE 120 UNITS) 6/15/23   MD CHANTALE Boyce 100 UNIT/ML injection pen INJECT 12 UNITS

## 2023-07-21 ENCOUNTER — OFFICE VISIT (OUTPATIENT)
Dept: SURGERY | Age: 38
End: 2023-07-21
Payer: OTHER GOVERNMENT

## 2023-07-21 VITALS
SYSTOLIC BLOOD PRESSURE: 120 MMHG | HEART RATE: 58 BPM | DIASTOLIC BLOOD PRESSURE: 70 MMHG | WEIGHT: 235 LBS | OXYGEN SATURATION: 99 % | TEMPERATURE: 97.7 F | HEIGHT: 75 IN | BODY MASS INDEX: 29.22 KG/M2

## 2023-07-21 DIAGNOSIS — K80.70 CHOLELITHIASIS WITH CHOLEDOCHOLITHIASIS: Primary | ICD-10-CM

## 2023-07-21 PROCEDURE — 99205 OFFICE O/P NEW HI 60 MIN: CPT | Performed by: SURGERY

## 2023-07-31 ENCOUNTER — HOSPITAL ENCOUNTER (OUTPATIENT)
Dept: ULTRASOUND IMAGING | Age: 38
Discharge: HOME | End: 2023-08-02
Payer: OTHER GOVERNMENT

## 2023-07-31 DIAGNOSIS — R17 JAUNDICE: ICD-10-CM

## 2023-07-31 PROCEDURE — 76705 ECHO EXAM OF ABDOMEN: CPT

## 2023-08-08 ENCOUNTER — TELEPHONE (OUTPATIENT)
Dept: ENDOCRINOLOGY | Age: 38
End: 2023-08-08

## 2023-08-30 ENCOUNTER — ANESTHESIA EVENT (OUTPATIENT)
Dept: ENDOSCOPY | Age: 38
End: 2023-08-30
Payer: OTHER GOVERNMENT

## 2023-08-31 ENCOUNTER — APPOINTMENT (OUTPATIENT)
Dept: GENERAL RADIOLOGY | Age: 38
End: 2023-08-31
Attending: INTERNAL MEDICINE
Payer: OTHER GOVERNMENT

## 2023-08-31 ENCOUNTER — HOSPITAL ENCOUNTER (OUTPATIENT)
Age: 38
Setting detail: OUTPATIENT SURGERY
Discharge: HOME | End: 2023-08-31
Attending: INTERNAL MEDICINE | Admitting: INTERNAL MEDICINE
Payer: OTHER GOVERNMENT

## 2023-08-31 ENCOUNTER — ANESTHESIA (OUTPATIENT)
Dept: ENDOSCOPY | Age: 38
End: 2023-08-31
Payer: OTHER GOVERNMENT

## 2023-08-31 VITALS
HEART RATE: 85 BPM | WEIGHT: 230 LBS | SYSTOLIC BLOOD PRESSURE: 123 MMHG | TEMPERATURE: 97.8 F | BODY MASS INDEX: 29.52 KG/M2 | RESPIRATION RATE: 18 BRPM | DIASTOLIC BLOOD PRESSURE: 62 MMHG | HEIGHT: 74 IN | OXYGEN SATURATION: 97 %

## 2023-08-31 DIAGNOSIS — R52 PAIN: ICD-10-CM

## 2023-08-31 PROCEDURE — 74300 X-RAY BILE DUCTS/PANCREAS: CPT

## 2023-08-31 PROCEDURE — 3700000001 HC ADD 15 MINUTES (ANESTHESIA): Performed by: INTERNAL MEDICINE

## 2023-08-31 PROCEDURE — 6370000000 HC RX 637 (ALT 250 FOR IP): Performed by: INTERNAL MEDICINE

## 2023-08-31 PROCEDURE — 43275 ERCP REMOVE FORGN BODY DUCT: CPT | Performed by: INTERNAL MEDICINE

## 2023-08-31 PROCEDURE — 74328 X-RAY BILE DUCT ENDOSCOPY: CPT | Performed by: INTERNAL MEDICINE

## 2023-08-31 PROCEDURE — 7100000010 HC PHASE II RECOVERY - FIRST 15 MIN: Performed by: INTERNAL MEDICINE

## 2023-08-31 PROCEDURE — 2580000003 HC RX 258

## 2023-08-31 PROCEDURE — 6360000004 HC RX CONTRAST MEDICATION: Performed by: INTERNAL MEDICINE

## 2023-08-31 PROCEDURE — 3700000000 HC ANESTHESIA ATTENDED CARE: Performed by: INTERNAL MEDICINE

## 2023-08-31 PROCEDURE — 43264 ERCP REMOVE DUCT CALCULI: CPT | Performed by: INTERNAL MEDICINE

## 2023-08-31 PROCEDURE — 3609015000 HC ERCP REMOVE FOREIGN BODY/STENT BILIARY/PANC DUCT: Performed by: INTERNAL MEDICINE

## 2023-08-31 PROCEDURE — 2780000010 HC IMPLANT OTHER: Performed by: INTERNAL MEDICINE

## 2023-08-31 PROCEDURE — 7100000011 HC PHASE II RECOVERY - ADDTL 15 MIN: Performed by: INTERNAL MEDICINE

## 2023-08-31 PROCEDURE — 6360000002 HC RX W HCPCS: Performed by: REGISTERED NURSE

## 2023-08-31 PROCEDURE — 2709999900 HC NON-CHARGEABLE SUPPLY: Performed by: INTERNAL MEDICINE

## 2023-08-31 PROCEDURE — 2580000003 HC RX 258: Performed by: INTERNAL MEDICINE

## 2023-08-31 PROCEDURE — 2500000003 HC RX 250 WO HCPCS: Performed by: REGISTERED NURSE

## 2023-08-31 RX ORDER — SIMETHICONE 20 MG/.3ML
EMULSION ORAL PRN
Status: DISCONTINUED | OUTPATIENT
Start: 2023-08-31 | End: 2023-08-31 | Stop reason: ALTCHOICE

## 2023-08-31 RX ORDER — SODIUM CHLORIDE 9 MG/ML
INJECTION, SOLUTION INTRAVENOUS CONTINUOUS
Status: CANCELLED | OUTPATIENT
Start: 2023-08-31

## 2023-08-31 RX ORDER — LIDOCAINE HYDROCHLORIDE 20 MG/ML
INJECTION, SOLUTION INFILTRATION; PERINEURAL PRN
Status: DISCONTINUED | OUTPATIENT
Start: 2023-08-31 | End: 2023-08-31 | Stop reason: SDUPTHER

## 2023-08-31 RX ORDER — SODIUM CHLORIDE 9 MG/ML
INJECTION, SOLUTION INTRAVENOUS
Status: COMPLETED
Start: 2023-08-31 | End: 2023-08-31

## 2023-08-31 RX ORDER — GLYCOPYRROLATE 1 MG/5 ML
SYRINGE (ML) INTRAVENOUS PRN
Status: DISCONTINUED | OUTPATIENT
Start: 2023-08-31 | End: 2023-08-31 | Stop reason: SDUPTHER

## 2023-08-31 RX ORDER — SODIUM CHLORIDE 9 MG/ML
INJECTION, SOLUTION INTRAVENOUS CONTINUOUS
Status: DISCONTINUED | OUTPATIENT
Start: 2023-08-31 | End: 2023-08-31 | Stop reason: HOSPADM

## 2023-08-31 RX ORDER — SODIUM CHLORIDE 9 MG/ML
INJECTION, SOLUTION INTRAVENOUS PRN
Status: DISCONTINUED | OUTPATIENT
Start: 2023-08-31 | End: 2023-08-31 | Stop reason: HOSPADM

## 2023-08-31 RX ORDER — SODIUM CHLORIDE 0.9 % (FLUSH) 0.9 %
5-40 SYRINGE (ML) INJECTION EVERY 12 HOURS SCHEDULED
Status: CANCELLED | OUTPATIENT
Start: 2023-08-31

## 2023-08-31 RX ORDER — MAGNESIUM HYDROXIDE 1200 MG/15ML
LIQUID ORAL PRN
Status: DISCONTINUED | OUTPATIENT
Start: 2023-08-31 | End: 2023-08-31 | Stop reason: ALTCHOICE

## 2023-08-31 RX ORDER — SIMETHICONE 20 MG/.3ML
EMULSION ORAL
Status: DISCONTINUED
Start: 2023-08-31 | End: 2023-08-31 | Stop reason: HOSPADM

## 2023-08-31 RX ORDER — PROPOFOL 10 MG/ML
INJECTION, EMULSION INTRAVENOUS PRN
Status: DISCONTINUED | OUTPATIENT
Start: 2023-08-31 | End: 2023-08-31 | Stop reason: SDUPTHER

## 2023-08-31 RX ORDER — SODIUM CHLORIDE 0.9 % (FLUSH) 0.9 %
5-40 SYRINGE (ML) INJECTION EVERY 12 HOURS SCHEDULED
Status: DISCONTINUED | OUTPATIENT
Start: 2023-08-31 | End: 2023-08-31 | Stop reason: HOSPADM

## 2023-08-31 RX ORDER — SODIUM CHLORIDE 9 MG/ML
INJECTION, SOLUTION INTRAVENOUS PRN
Status: CANCELLED | OUTPATIENT
Start: 2023-08-31

## 2023-08-31 RX ADMIN — PROPOFOL 150 MG: 10 INJECTION, EMULSION INTRAVENOUS at 08:16

## 2023-08-31 RX ADMIN — PROPOFOL 100 MG: 10 INJECTION, EMULSION INTRAVENOUS at 08:22

## 2023-08-31 RX ADMIN — SODIUM CHLORIDE: 9 INJECTION, SOLUTION INTRAVENOUS at 07:26

## 2023-08-31 RX ADMIN — PROPOFOL 50 MG: 10 INJECTION, EMULSION INTRAVENOUS at 08:28

## 2023-08-31 RX ADMIN — LIDOCAINE HYDROCHLORIDE 100 MG: 20 INJECTION, SOLUTION INFILTRATION; PERINEURAL at 08:09

## 2023-08-31 RX ADMIN — PROPOFOL 100 MG: 10 INJECTION, EMULSION INTRAVENOUS at 08:25

## 2023-08-31 RX ADMIN — Medication 0.2 MG: at 08:09

## 2023-08-31 RX ADMIN — PROPOFOL 50 MG: 10 INJECTION, EMULSION INTRAVENOUS at 08:19

## 2023-08-31 ASSESSMENT — PAIN SCALES - GENERAL
PAINLEVEL_OUTOF10: 0

## 2023-08-31 ASSESSMENT — PAIN - FUNCTIONAL ASSESSMENT: PAIN_FUNCTIONAL_ASSESSMENT: 0-10

## 2023-08-31 NOTE — ANESTHESIA POSTPROCEDURE EVALUATION
Department of Anesthesiology  Postprocedure Note    Patient: Jeremy Serrato  MRN: [id number]  YOB: 1985  Date of evaluation: 8/31/2023      Procedure Summary     Date: 08/31/23 Room / Location: [address] [place] [address] / Addison Moron    Anesthesia Start: 8345 Anesthesia Stop: 0012    Procedure: ERCP STENT REMOVAL with balloon sweep Diagnosis:       Encounter for removal of biliary stent      (Encounter for removal of biliary stent [Z46.89])    Surgeons: Zheng Acevedo MD Responsible Provider: FROILAN Wilson CRNA    Anesthesia Type: MAC, general ASA Status: 2          Anesthesia Type: No value filed.     Korin Phase I: Korin Score: 10    Korin Phase II: Korin Score: 9      Anesthesia Post Evaluation    Patient location during evaluation: bedside  Patient participation: complete - patient participated  Level of consciousness: awake and awake and alert  Airway patency: patent  Nausea & Vomiting: no nausea and no vomiting  Complications: no  Cardiovascular status: blood pressure returned to baseline and hemodynamically stable  Respiratory status: acceptable  Hydration status: euvolemic  Pain management: adequate

## 2023-09-14 ENCOUNTER — OFFICE VISIT (OUTPATIENT)
Dept: ENDOCRINOLOGY | Age: 38
End: 2023-09-14

## 2023-09-14 VITALS
HEIGHT: 74 IN | OXYGEN SATURATION: 99 % | WEIGHT: 232 LBS | HEART RATE: 69 BPM | DIASTOLIC BLOOD PRESSURE: 69 MMHG | BODY MASS INDEX: 29.77 KG/M2 | SYSTOLIC BLOOD PRESSURE: 120 MMHG

## 2023-09-14 DIAGNOSIS — E10.65 TYPE I DIABETES MELLITUS WITH HYPEROSMOLAR COMA (HCC): Primary | ICD-10-CM

## 2023-09-14 DIAGNOSIS — E87.0 TYPE I DIABETES MELLITUS WITH HYPEROSMOLAR COMA (HCC): ICD-10-CM

## 2023-09-14 DIAGNOSIS — E10.69 TYPE I DIABETES MELLITUS WITH HYPEROSMOLAR COMA (HCC): ICD-10-CM

## 2023-09-14 DIAGNOSIS — E10.65 TYPE I DIABETES MELLITUS WITH HYPEROSMOLAR COMA (HCC): ICD-10-CM

## 2023-09-14 DIAGNOSIS — E10.69 TYPE I DIABETES MELLITUS WITH HYPEROSMOLAR COMA (HCC): Primary | ICD-10-CM

## 2023-09-14 DIAGNOSIS — E87.0 TYPE I DIABETES MELLITUS WITH HYPEROSMOLAR COMA (HCC): Primary | ICD-10-CM

## 2023-09-14 LAB
ALBUMIN SERPL-MCNC: 4.8 G/DL (ref 3.5–4.6)
ALP SERPL-CCNC: 132 U/L (ref 35–104)
ALT SERPL-CCNC: 26 U/L (ref 0–41)
ANION GAP SERPL CALCULATED.3IONS-SCNC: 11 MEQ/L (ref 9–15)
AST SERPL-CCNC: 23 U/L (ref 0–40)
BILIRUB DIRECT SERPL-MCNC: <0.2 MG/DL (ref 0–0.4)
BILIRUB INDIRECT SERPL-MCNC: ABNORMAL MG/DL (ref 0–0.6)
BILIRUB SERPL-MCNC: 0.4 MG/DL (ref 0.2–0.7)
BUN SERPL-MCNC: 13 MG/DL (ref 6–20)
CALCIUM SERPL-MCNC: 9.4 MG/DL (ref 8.5–9.9)
CHLORIDE SERPL-SCNC: 105 MEQ/L (ref 95–107)
CHP ED QC CHECK: NORMAL
CO2 SERPL-SCNC: 27 MEQ/L (ref 20–31)
CREAT SERPL-MCNC: 0.86 MG/DL (ref 0.7–1.2)
GLUCOSE BLD-MCNC: 66 MG/DL
GLUCOSE SERPL-MCNC: 54 MG/DL (ref 70–99)
HBA1C MFR BLD: 6.4 % (ref 4.8–5.9)
LIPASE SERPL-CCNC: 26 U/L (ref 12–95)
POTASSIUM SERPL-SCNC: 3.7 MEQ/L (ref 3.4–4.9)
PROT SERPL-MCNC: 7.2 G/DL (ref 6.3–8)
SODIUM SERPL-SCNC: 143 MEQ/L (ref 135–144)

## 2023-09-14 RX ORDER — INSULIN GLARGINE 100 [IU]/ML
INJECTION, SOLUTION SUBCUTANEOUS
Qty: 15 ML | Refills: 3 | Status: SHIPPED | OUTPATIENT
Start: 2023-09-14

## 2023-09-14 ASSESSMENT — ENCOUNTER SYMPTOMS: EYES NEGATIVE: 1

## 2023-09-14 NOTE — PROGRESS NOTES
Unable to Pay for Housing in the Last Year: Not on file     Number of Places Lived in the Last Year: Not on file     Unstable Housing in the Last Year: No     Family History   Problem Relation Age of Onset    Heart Attack Father     Colon Cancer Neg Hx      No Known Allergies    Current Outpatient Medications:     Continuous Blood Gluc Sensor (DEXCOM G7 SENSOR) MISC, Change every 10 days, Disp: 3 each, Rfl: 0    Insulin Regular Human (AFREZZA) 60x4 &60x8 & 60x12 UNIT POWD, INHALE 8-24 UNITS BY MOUTH AT MEALTIME AND ADDITIONAL FOR GLUCOSE CONTROL (MAX DAILY DOSE 120 UNITS), Disp: 360 each, Rfl: 3    BASAGLAR KWIKPEN 100 UNIT/ML injection pen, INJECT 12 UNITS SUBUTANEOUSLY IN THE MORNING AND INJECT 22 UNITS SUBUTANEOUSLY IN THE EVENING., Disp: 15 mL, Rfl: 3    AFREZZA 60x4 &60x8 & 60x12 UNIT POWD, INHALE 8 TO 24 UNITS BY MOUTH AT MEALTIME AND ADDITIONAL FOR GLUCOSE CONTROL.  MAXIMUM DAILY DOSE 120 UNITS, Disp: 360 each, Rfl: 3    Insulin Pen Needle 32G X 4 MM MISC, qid, Disp: 200 each, Rfl: 3    Insulin Regular Human (AFREZZA) 60x4 &60x8 & 60x12 UNIT POWD, Lot [id number] Exp 10/22 2 boxes were given, Disp: 2 each, Rfl: 0    Insulin Regular Human 60x4 &60x8 & 60x12 UNIT POWD, Take 16 units  Am 8 units lunch and 16 units dinner, Disp: 90 each, Rfl: 5    insulin aspart (NOVOLOG FLEXPEN) 100 UNIT/ML injection pen, 8 to units with each meals, Disp: 10 pen, Rfl: 3    Insulin Pen Needle (RELION PEN NEEDLES) 31G X 6 MM MISC, Use 5 daily with insulin, Disp: 100 each, Rfl: 3    Insulin Regular Human 4 & 8 & 12 units POWD, Take 16 units  Am 8 units lunch and 16 units dinner, Disp: 60 each, Rfl: 3    blood glucose test strips (ACCU-CHEK GUIDE) strip, Pt test 4x daily Dx E10.65, Disp: 150 each, Rfl: 3    Glucagon (GVOKE HYPOPEN 2-PACK) 1 MG/0.2ML SOAJ, As needed, Disp: 2 Syringe, Rfl: 3  Lab Results   Component Value Date     (L) 07/11/2023    K 3.7 07/11/2023     07/11/2023    CO2 20 07/11/2023    BUN 15 07/11/2023

## 2023-09-15 LAB — VITAMIN D 25-HYDROXY: 32.7 NG/ML

## 2023-09-16 LAB — GAD65 AB SER IA-ACNC: 6.5 IU/ML (ref 0–5)

## 2023-12-21 DIAGNOSIS — E10.69 TYPE I DIABETES MELLITUS WITH HYPEROSMOLAR COMA (HCC): ICD-10-CM

## 2023-12-21 DIAGNOSIS — E10.65 TYPE I DIABETES MELLITUS WITH HYPEROSMOLAR COMA (HCC): ICD-10-CM

## 2023-12-21 DIAGNOSIS — E87.0 TYPE I DIABETES MELLITUS WITH HYPEROSMOLAR COMA (HCC): ICD-10-CM

## 2023-12-21 LAB
ANION GAP SERPL CALCULATED.3IONS-SCNC: 12 MEQ/L (ref 9–15)
BUN SERPL-MCNC: 14 MG/DL (ref 6–20)
CALCIUM SERPL-MCNC: 9.1 MG/DL (ref 8.5–9.9)
CHLORIDE SERPL-SCNC: 103 MEQ/L (ref 95–107)
CO2 SERPL-SCNC: 28 MEQ/L (ref 20–31)
CREAT SERPL-MCNC: 0.9 MG/DL (ref 0.7–1.2)
CREAT UR-MCNC: 212.5 MG/DL
GLUCOSE SERPL-MCNC: 134 MG/DL (ref 70–99)
HBA1C MFR BLD: 7.5 % (ref 4.8–5.9)
MICROALBUMIN UR-MCNC: 3.1 MG/DL
MICROALBUMIN/CREAT UR-RTO: 14.6 MG/G (ref 0–30)
POTASSIUM SERPL-SCNC: 4.2 MEQ/L (ref 3.4–4.9)
SODIUM SERPL-SCNC: 143 MEQ/L (ref 135–144)

## 2024-02-20 NOTE — ANESTHESIA PRE PROCEDURE
Department of Anesthesiology  Preprocedure Note       Name:  Ed Gee   Age:  40 y.o.  :  1985                                          MRN:  [id number]         Date:  2023      Surgeon: Jonatan Altamirano):  Sylvester Montalvo MD    Procedure: Procedure(s):  ERCP STENT REMOVAL    Medications prior to admission:   Prior to Admission medications    Medication Sig Start Date End Date Taking? Authorizing Provider   Continuous Blood Gluc Sensor (DEXCOM G7 SENSOR) MISC Change every 10 days 23   Nacho Pearson MD   Insulin Regular Human (AFREZZA) 60x4 &60x8 & 60x12 UNIT POWD INHALE 8-24 UNITS BY MOUTH AT MEALTIME AND ADDITIONAL FOR GLUCOSE CONTROL (MAX DAILY DOSE 120 UNITS) 6/15/23   Wilton Gutierrez MD   BASAGLAR KWIKPEN 100 UNIT/ML injection pen INJECT 12 UNITS SUBUTANEOUSLY IN THE MORNING AND INJECT 22 UNITS SUBUTANEOUSLY IN THE EVENING. 23   MD GUERITA Rubalcava 60x4 &60x8 & 60x12 UNIT POWD INHALE 8 TO 24 UNITS BY MOUTH AT MEALTIME AND ADDITIONAL FOR GLUCOSE CONTROL.  MAXIMUM DAILY DOSE 120 UNITS 23   Nacho Pearson MD   Insulin Pen Needle 32G X 4 MM MISC qid 23   Nacho Pearson MD   Insulin Regular Human [facility]) 60x4 &60x8 & 60x12 UNIT POWD Lot [id number] Exp 10/22 2 boxes were given 22   Nacho Pearson MD   Insulin Regular Human 60x4 &60x8 & 60x12 UNIT POWD Take 16 units  Am 8 units lunch and 16 units dinner 7/15/22   Nacho Pearson MD   insulin aspart (NOVOLOG FLEXPEN) 100 UNIT/ML injection pen 8 to units with each meals 22   Nacho Pearson MD   Insulin Pen Needle (RELION PEN NEEDLES) 31G X 6 MM MISC Use 5 daily with insulin 21   Nacho Pearson MD   Insulin Regular Human 4 & 8 & 12 units POWD Take 16 units  Am 8 units lunch and 16 units dinner 21   Nacho Pearson MD   blood glucose test strips (ACCU-CHEK GUIDE) strip Pt test 4x daily Dx E10.65 21   Nacho Pearson MD   Glucagon (Kalpana Hoot 2-PACK) 1 MG/0.2ML SOAJ As needed 21   Render Mealing, MD       Current medications:    No current Prescription given to patient/guardian

## 2024-03-22 ENCOUNTER — OFFICE VISIT (OUTPATIENT)
Dept: ENDOCRINOLOGY | Age: 39
End: 2024-03-22
Payer: OTHER GOVERNMENT

## 2024-03-22 VITALS
SYSTOLIC BLOOD PRESSURE: 130 MMHG | DIASTOLIC BLOOD PRESSURE: 76 MMHG | HEIGHT: 74 IN | HEART RATE: 79 BPM | OXYGEN SATURATION: 94 % | BODY MASS INDEX: 33.37 KG/M2 | WEIGHT: 260 LBS

## 2024-03-22 DIAGNOSIS — E87.0 TYPE I DIABETES MELLITUS WITH HYPEROSMOLAR COMA (HCC): ICD-10-CM

## 2024-03-22 DIAGNOSIS — E10.65 TYPE I DIABETES MELLITUS WITH HYPEROSMOLAR COMA (HCC): ICD-10-CM

## 2024-03-22 DIAGNOSIS — E87.0 TYPE I DIABETES MELLITUS WITH HYPEROSMOLAR COMA (HCC): Primary | ICD-10-CM

## 2024-03-22 DIAGNOSIS — E10.69 TYPE I DIABETES MELLITUS WITH HYPEROSMOLAR COMA (HCC): ICD-10-CM

## 2024-03-22 DIAGNOSIS — E10.65 TYPE I DIABETES MELLITUS WITH HYPEROSMOLAR COMA (HCC): Primary | ICD-10-CM

## 2024-03-22 DIAGNOSIS — E10.69 TYPE I DIABETES MELLITUS WITH HYPEROSMOLAR COMA (HCC): Primary | ICD-10-CM

## 2024-03-22 LAB
ANION GAP SERPL CALCULATED.3IONS-SCNC: 14 MEQ/L (ref 9–15)
BUN SERPL-MCNC: 16 MG/DL (ref 6–20)
CALCIUM SERPL-MCNC: 9.3 MG/DL (ref 8.5–9.9)
CHLORIDE SERPL-SCNC: 102 MEQ/L (ref 95–107)
CHP ED QC CHECK: NORMAL
CO2 SERPL-SCNC: 27 MEQ/L (ref 20–31)
CREAT SERPL-MCNC: 0.88 MG/DL (ref 0.7–1.2)
GLUCOSE BLD-MCNC: 83 MG/DL
GLUCOSE SERPL-MCNC: 58 MG/DL (ref 70–99)
HBA1C MFR BLD: 7.5 % (ref 4.8–5.9)
POTASSIUM SERPL-SCNC: 3.6 MEQ/L (ref 3.4–4.9)
SODIUM SERPL-SCNC: 143 MEQ/L (ref 135–144)

## 2024-03-22 PROCEDURE — 99213 OFFICE O/P EST LOW 20 MIN: CPT | Performed by: INTERNAL MEDICINE

## 2024-03-22 PROCEDURE — 95251 CONT GLUC MNTR ANALYSIS I&R: CPT | Performed by: INTERNAL MEDICINE

## 2024-03-22 PROCEDURE — 82962 GLUCOSE BLOOD TEST: CPT | Performed by: INTERNAL MEDICINE

## 2024-03-22 RX ORDER — TESTOSTERONE CYPIONATE 200 MG/ML
200 INJECTION, SOLUTION INTRAMUSCULAR ONCE
COMMUNITY
Start: 2024-03-14

## 2024-03-22 RX ORDER — INSULIN HUMAN 4-8-12(60)
KIT INHALATION
Qty: 2 EACH | Refills: 3 | Status: CANCELLED | OUTPATIENT
Start: 2024-03-22

## 2024-03-22 RX ORDER — INSULIN HUMAN 4-8-12(60)
KIT INHALATION
Qty: 360 EACH | Refills: 3 | Status: SHIPPED | OUTPATIENT
Start: 2024-03-22

## 2024-03-22 RX ORDER — ACYCLOVIR 400 MG/1
TABLET ORAL
Qty: 3 EACH | Refills: 3 | Status: CANCELLED | OUTPATIENT
Start: 2024-03-22

## 2024-03-22 RX ORDER — INSULIN GLARGINE 100 [IU]/ML
INJECTION, SOLUTION SUBCUTANEOUS
Qty: 15 ML | Refills: 3 | Status: SHIPPED | OUTPATIENT
Start: 2024-03-22

## 2024-03-22 RX ORDER — SYRINGE WITH NEEDLE, 1 ML 25GX5/8"
SYRINGE, EMPTY DISPOSABLE MISCELLANEOUS
COMMUNITY
Start: 2024-02-09

## 2024-03-22 ASSESSMENT — ENCOUNTER SYMPTOMS
VISUAL CHANGE: 0
EYES NEGATIVE: 1

## 2024-03-22 NOTE — PROGRESS NOTES
3/22/2024    Assessment:       Diagnosis Orders   1. Type I diabetes mellitus with hyperosmolar coma (HCC)  POCT Glucose            PLAN:     Orders Placed This Encounter   Procedures    Basic Metabolic Panel     Standing Status:   Future     Standing Expiration Date:   3/22/2025    Hemoglobin A1C     Standing Status:   Future     Standing Expiration Date:   3/22/2025    Microalbumin / Creatinine Urine Ratio     Standing Status:   Future     Standing Expiration Date:   3/22/2025    POCT Glucose    ID CONTINUOUS GLUCOSE MONITORING ANALYSIS I&R     Orders Placed This Encounter   Medications    BASAGLAR KWIKPEN 100 UNIT/ML injection pen     Sig: INJECT 20 UNITS SUBUTANEOUSLY IN THE MORNING AND INJECT 30 UNITS SUBUTANEOUSLY IN THE EVENING.     Dispense:  15 mL     Refill:  3    Insulin Regular Human (AFREZZA) 60x4 &60x8 & 60x12 UNIT POWD     Sig: INHALE 8-24 UNITS BY MOUTH AT MEALTIME AND ADDITIONAL FOR GLUCOSE CONTROL (MAX DAILY DOSE 120 UNITS)     Dispense:  360 each     Refill:  3   Continue current insulin regimen with Basaglar Afrezza  Continue using Dexcom continuous glucose monitoring patient to follow-up in 3 months labs to be done today      Orders Placed This Encounter   Procedures    POCT Glucose     No orders of the defined types were placed in this encounter.    No follow-ups on file.  Subjective:     Chief Complaint   Patient presents with    Diabetes     Vitals:    03/22/24 0934   BP: 130/76   Pulse: 79   SpO2: 94%   Weight: 117.9 kg (260 lb)   Height: 1.88 m (6' 2.02\")     Wt Readings from Last 3 Encounters:   03/22/24 117.9 kg (260 lb)   12/21/23 112.5 kg (248 lb)   09/14/23 105.2 kg (232 lb)     BP Readings from Last 3 Encounters:   03/22/24 130/76   12/21/23 135/81   09/14/23 120/69     Follow-up on type 1 diabetes patient on Basaglar twice daily 20 units in the morning and 30 units in the evening plus Afrezza inhaled insulin 3 times daily using Dexcom continuous glucose monitoring no recent labs to

## 2024-04-11 ENCOUNTER — TELEPHONE (OUTPATIENT)
Dept: ENDOCRINOLOGY | Age: 39
End: 2024-04-11

## 2024-04-15 DIAGNOSIS — E10.69 TYPE I DIABETES MELLITUS WITH HYPEROSMOLAR COMA (HCC): ICD-10-CM

## 2024-04-15 DIAGNOSIS — E10.65 TYPE I DIABETES MELLITUS WITH HYPEROSMOLAR COMA (HCC): ICD-10-CM

## 2024-04-15 DIAGNOSIS — E87.0 TYPE I DIABETES MELLITUS WITH HYPEROSMOLAR COMA (HCC): ICD-10-CM

## 2024-04-15 RX ORDER — ACYCLOVIR 400 MG/1
TABLET ORAL
Qty: 3 EACH | Refills: 0 | Status: SHIPPED | OUTPATIENT
Start: 2024-04-15

## 2024-04-18 RX ORDER — PEN NEEDLE, DIABETIC 32GX 5/32"
NEEDLE, DISPOSABLE MISCELLANEOUS
Qty: 100 EACH | Refills: 3 | Status: SHIPPED | OUTPATIENT
Start: 2024-04-18

## 2024-05-10 ENCOUNTER — OFFICE VISIT (OUTPATIENT)
Dept: FAMILY MEDICINE CLINIC | Age: 39
End: 2024-05-10
Payer: OTHER GOVERNMENT

## 2024-05-10 VITALS
HEIGHT: 74 IN | BODY MASS INDEX: 32.98 KG/M2 | SYSTOLIC BLOOD PRESSURE: 130 MMHG | WEIGHT: 257 LBS | DIASTOLIC BLOOD PRESSURE: 84 MMHG | HEART RATE: 98 BPM | OXYGEN SATURATION: 98 %

## 2024-05-10 DIAGNOSIS — Z00.00 ANNUAL PHYSICAL EXAM: Primary | ICD-10-CM

## 2024-05-10 PROCEDURE — 99395 PREV VISIT EST AGE 18-39: CPT | Performed by: FAMILY MEDICINE

## 2024-05-10 ASSESSMENT — ENCOUNTER SYMPTOMS
WHEEZING: 0
SORE THROAT: 0
COUGH: 0
CONSTIPATION: 0
SHORTNESS OF BREATH: 0
ABDOMINAL PAIN: 0
DIARRHEA: 0
RHINORRHEA: 0

## 2024-05-10 ASSESSMENT — PATIENT HEALTH QUESTIONNAIRE - PHQ9
SUM OF ALL RESPONSES TO PHQ9 QUESTIONS 1 & 2: 0
2. FEELING DOWN, DEPRESSED OR HOPELESS: NOT AT ALL
1. LITTLE INTEREST OR PLEASURE IN DOING THINGS: NOT AT ALL
SUM OF ALL RESPONSES TO PHQ QUESTIONS 1-9: 0

## 2024-05-10 NOTE — PROGRESS NOTES
[facility] PRIMARY CARE  [address] [place] [id number]  Dept: [phone]  Dept Fax: [phone]     Chief Complaint:  Chief Complaint   Patient presents with    Annual Exam     For pilots license.       Vitals:    05/10/24 1039   BP: 130/84   Site: Left Upper Arm   Position: Sitting   Cuff Size: Medium Adult   Pulse: 98   SpO2: 98%   Weight: 116.6 kg (257 lb)   Height: 1.88 m (6' 2\")       HPI:  38 y.o.male who presents for the following:      Works as ; In training for recreational 's license; seeing endocrine for the DM1 and started inhaled insulin    -----------------------------------------------------------------------------    Assessment/Plan:  38 y.o. male here mainly for the following:  Annual  Already had plenty of labs with endocrine so will hold off for today  Filled his form for work and 's license     Diagnosis Orders   1. Annual physical exam             Return in about 1 year (around 5/10/2025) for annual exam.    Kike Love MD      -----------------------------------------------------------------------------      Objective     Physical Exam:  Physical Exam  Vitals reviewed.   Constitutional:       General: He is not in acute distress.     Appearance: He is well-developed.   HENT:      Head: Normocephalic and atraumatic.      Right Ear: Tympanic membrane, ear canal and external ear normal.      Left Ear: Tympanic membrane, ear canal and external ear normal.      Mouth/Throat:      Pharynx: No oropharyngeal exudate.   Neck:      Thyroid: No thyromegaly.   Cardiovascular:      Rate and Rhythm: Normal rate and regular rhythm.      Heart sounds: Normal heart sounds. No murmur heard.  Pulmonary:      Effort: Pulmonary effort is normal. No respiratory distress.      Breath sounds: Normal breath sounds. No wheezing.   Abdominal:      General: There is no distension.      Palpations: Abdomen is soft.      Tenderness: There is no abdominal

## 2024-05-28 ENCOUNTER — OFFICE VISIT (OUTPATIENT)
Dept: CARDIOLOGY CLINIC | Age: 39
End: 2024-05-28
Payer: OTHER GOVERNMENT

## 2024-05-28 VITALS
HEIGHT: 74 IN | SYSTOLIC BLOOD PRESSURE: 128 MMHG | HEART RATE: 71 BPM | WEIGHT: 258 LBS | OXYGEN SATURATION: 98 % | BODY MASS INDEX: 33.11 KG/M2 | RESPIRATION RATE: 15 BRPM | DIASTOLIC BLOOD PRESSURE: 82 MMHG

## 2024-05-28 DIAGNOSIS — Z00.00 PE (PHYSICAL EXAM), ANNUAL: Primary | ICD-10-CM

## 2024-05-28 PROCEDURE — 1036F TOBACCO NON-USER: CPT | Performed by: INTERNAL MEDICINE

## 2024-05-28 PROCEDURE — G8427 DOCREV CUR MEDS BY ELIG CLIN: HCPCS | Performed by: INTERNAL MEDICINE

## 2024-05-28 PROCEDURE — G8417 CALC BMI ABV UP PARAM F/U: HCPCS | Performed by: INTERNAL MEDICINE

## 2024-05-28 PROCEDURE — 99214 OFFICE O/P EST MOD 30 MIN: CPT | Performed by: INTERNAL MEDICINE

## 2024-05-28 PROCEDURE — 93000 ELECTROCARDIOGRAM COMPLETE: CPT | Performed by: INTERNAL MEDICINE

## 2024-05-28 ASSESSMENT — ENCOUNTER SYMPTOMS
SHORTNESS OF BREATH: 0
NAUSEA: 0
WHEEZING: 0
CHEST TIGHTNESS: 0
GASTROINTESTINAL NEGATIVE: 1
RESPIRATORY NEGATIVE: 1
BLOOD IN STOOL: 0
EYES NEGATIVE: 1
STRIDOR: 0
COUGH: 0

## 2024-05-28 NOTE — PROGRESS NOTES
need.     RTO for annual exam.     Counseling:  Heart Healthy Lifestyle, Low Salt Diet, Take Precautions to Prevent Falls and Walk Daily    Return in about 1 year (around 5/28/2025).    Electronically signed by RUSSELL VALERO MD on 5/28/2024 at 12:02 PM

## 2024-06-02 ENCOUNTER — PATIENT MESSAGE (OUTPATIENT)
Dept: PULMONOLOGY | Age: 39
End: 2024-06-02

## 2024-06-02 DIAGNOSIS — Z79.899 MEDICATION MANAGEMENT: Primary | ICD-10-CM

## 2024-06-04 NOTE — TELEPHONE ENCOUNTER
From: Shayne Murphy  To: Dr. Anthony Taylor  Sent: 6/2/2024 12:04 PM EDT  Subject: PFT    Dr. Taylor,    I will need a PFT for work again. I have an appointment with you on Friday June 14th at 0815. Is that something I can or need to get before the appointment? Or can you order it so I can schedule it for after?    Thanks

## 2024-06-14 RX ORDER — INSULIN HUMAN 4-8-12(60)
KIT INHALATION
Qty: 120 EACH | Refills: 5 | Status: SHIPPED | OUTPATIENT
Start: 2024-06-14

## 2024-06-26 RX ORDER — INSULIN HUMAN 4-8-12(60)
KIT INHALATION
Qty: 360 EACH | Refills: 3 | Status: SHIPPED | OUTPATIENT
Start: 2024-06-26

## 2024-06-27 ENCOUNTER — HOSPITAL ENCOUNTER (OUTPATIENT)
Dept: PULMONOLOGY | Age: 39
Discharge: HOME | End: 2024-06-27
Payer: COMMERCIAL

## 2024-06-27 DIAGNOSIS — Z79.899 MEDICATION MANAGEMENT: ICD-10-CM

## 2024-06-27 DIAGNOSIS — E10.69 TYPE I DIABETES MELLITUS WITH HYPEROSMOLAR COMA (HCC): ICD-10-CM

## 2024-06-27 DIAGNOSIS — E10.65 TYPE I DIABETES MELLITUS WITH HYPEROSMOLAR COMA (HCC): ICD-10-CM

## 2024-06-27 DIAGNOSIS — E87.0 TYPE I DIABETES MELLITUS WITH HYPEROSMOLAR COMA (HCC): ICD-10-CM

## 2024-06-27 LAB
ANION GAP SERPL CALCULATED.3IONS-SCNC: 13 MEQ/L (ref 9–15)
BUN SERPL-MCNC: 18 MG/DL (ref 6–20)
CALCIUM SERPL-MCNC: 9.4 MG/DL (ref 8.5–9.9)
CHLORIDE SERPL-SCNC: 104 MEQ/L (ref 95–107)
CO2 SERPL-SCNC: 27 MEQ/L (ref 20–31)
CREAT SERPL-MCNC: 0.95 MG/DL (ref 0.7–1.2)
GLUCOSE SERPL-MCNC: 91 MG/DL (ref 70–99)
POTASSIUM SERPL-SCNC: 4.2 MEQ/L (ref 3.4–4.9)
SODIUM SERPL-SCNC: 144 MEQ/L (ref 135–144)

## 2024-06-27 PROCEDURE — 94729 DIFFUSING CAPACITY: CPT

## 2024-06-27 PROCEDURE — 6360000002 HC RX W HCPCS

## 2024-06-27 PROCEDURE — 94726 PLETHYSMOGRAPHY LUNG VOLUMES: CPT

## 2024-06-27 PROCEDURE — 94060 EVALUATION OF WHEEZING: CPT

## 2024-06-27 RX ORDER — ALBUTEROL SULFATE 2.5 MG/3ML
SOLUTION RESPIRATORY (INHALATION)
Status: COMPLETED
Start: 2024-06-27 | End: 2024-06-27

## 2024-06-27 RX ADMIN — ALBUTEROL SULFATE 2.5 MG: 2.5 SOLUTION RESPIRATORY (INHALATION) at 12:35

## 2024-06-28 LAB
ESTIMATED AVERAGE GLUCOSE: 166 MG/DL
HBA1C MFR BLD: 7.4 % (ref 4–6)

## 2024-07-01 ENCOUNTER — OFFICE VISIT (OUTPATIENT)
Dept: ENDOCRINOLOGY | Age: 39
End: 2024-07-01
Payer: COMMERCIAL

## 2024-07-01 VITALS
OXYGEN SATURATION: 97 % | BODY MASS INDEX: 32.98 KG/M2 | DIASTOLIC BLOOD PRESSURE: 80 MMHG | SYSTOLIC BLOOD PRESSURE: 123 MMHG | HEART RATE: 63 BPM | HEIGHT: 74 IN | WEIGHT: 257 LBS

## 2024-07-01 DIAGNOSIS — E10.69 TYPE I DIABETES MELLITUS WITH HYPEROSMOLAR COMA (HCC): Primary | ICD-10-CM

## 2024-07-01 DIAGNOSIS — E10.65 TYPE I DIABETES MELLITUS WITH HYPEROSMOLAR COMA (HCC): Primary | ICD-10-CM

## 2024-07-01 DIAGNOSIS — E87.0 TYPE I DIABETES MELLITUS WITH HYPEROSMOLAR COMA (HCC): Primary | ICD-10-CM

## 2024-07-01 LAB
CHP ED QC CHECK: NORMAL
GLUCOSE BLD-MCNC: 176 MG/DL

## 2024-07-01 PROCEDURE — 82962 GLUCOSE BLOOD TEST: CPT | Performed by: INTERNAL MEDICINE

## 2024-07-01 PROCEDURE — 3051F HG A1C>EQUAL 7.0%<8.0%: CPT | Performed by: INTERNAL MEDICINE

## 2024-07-01 PROCEDURE — 1036F TOBACCO NON-USER: CPT | Performed by: INTERNAL MEDICINE

## 2024-07-01 PROCEDURE — G8417 CALC BMI ABV UP PARAM F/U: HCPCS | Performed by: INTERNAL MEDICINE

## 2024-07-01 PROCEDURE — 2022F DILAT RTA XM EVC RTNOPTHY: CPT | Performed by: INTERNAL MEDICINE

## 2024-07-01 PROCEDURE — G8427 DOCREV CUR MEDS BY ELIG CLIN: HCPCS | Performed by: INTERNAL MEDICINE

## 2024-07-01 PROCEDURE — 95251 CONT GLUC MNTR ANALYSIS I&R: CPT | Performed by: INTERNAL MEDICINE

## 2024-07-01 PROCEDURE — 99213 OFFICE O/P EST LOW 20 MIN: CPT | Performed by: INTERNAL MEDICINE

## 2024-07-01 ASSESSMENT — ENCOUNTER SYMPTOMS: EYES NEGATIVE: 1

## 2024-07-01 NOTE — PROGRESS NOTES
7/1/2024    Assessment:       Diagnosis Orders   1. Type I diabetes mellitus with hyperosmolar coma (HCC)  POCT Glucose            PLAN:     Orders Placed This Encounter   Procedures    Basic Metabolic Panel     Standing Status:   Future     Standing Expiration Date:   7/1/2025    Hemoglobin A1C     Standing Status:   Future     Standing Expiration Date:   7/1/2025    POCT Glucose    DE CONTINUOUS GLUCOSE MONITORING ANALYSIS I&R     Continue current insulin regimen continue patient on Dexcom CGM follow-up in 3 to 6 months time      Orders Placed This Encounter   Procedures    POCT Glucose     No orders of the defined types were placed in this encounter.    No follow-ups on file.  Subjective:     Chief Complaint   Patient presents with    Diabetes     Vitals:    07/01/24 1400   BP: 123/80   Pulse: 63   SpO2: 97%   Weight: 116.6 kg (257 lb)   Height: 1.88 m (6' 2.02\")     Wt Readings from Last 3 Encounters:   07/01/24 116.6 kg (257 lb)   05/28/24 117 kg (258 lb)   05/10/24 116.6 kg (257 lb)     BP Readings from Last 3 Encounters:   07/01/24 123/80   05/28/24 128/82   05/10/24 130/84       Follow-up on type 1 diabetes on Basaglar 20 units in the morning 30 units in the evening plus present inhaled insulin 8 to 24 units with using Dexcom continuous glucose monitoring 2-week glucose average was 197  37% in range 41% slightly high 20% very high 1% mild hypoglycemia  Hemoglobin A1c was 7.4  No severe hypoglycemia needing assistance  Hemoglobin A1C       Date                     Value               Ref Range           Status                06/27/2024               7.4 (H)             4.0 - 6.0 %         Final            ----------      Diabetes  He presents for his follow-up diabetic visit. He has type 1 diabetes mellitus. His disease course has been stable. Pertinent negatives for diabetes include no polydipsia and no polyuria. Current diabetic treatment includes insulin injections. He is currently taking insulin

## 2024-07-08 PROBLEM — Z79.899 MEDICATION MANAGEMENT: Status: ACTIVE | Noted: 2024-07-08

## 2024-07-16 ENCOUNTER — OFFICE VISIT (OUTPATIENT)
Dept: PULMONOLOGY | Age: 39
End: 2024-07-16
Payer: COMMERCIAL

## 2024-07-16 VITALS
WEIGHT: 255 LBS | DIASTOLIC BLOOD PRESSURE: 72 MMHG | SYSTOLIC BLOOD PRESSURE: 128 MMHG | OXYGEN SATURATION: 98 % | HEART RATE: 78 BPM | BODY MASS INDEX: 32.73 KG/M2

## 2024-07-16 DIAGNOSIS — Z79.899 MEDICATION MANAGEMENT: Primary | ICD-10-CM

## 2024-07-16 DIAGNOSIS — E10.9 TYPE 1 DIABETES MELLITUS WITHOUT COMPLICATION (HCC): ICD-10-CM

## 2024-07-16 DIAGNOSIS — E66.9 OBESITY (BMI 30-39.9): ICD-10-CM

## 2024-07-16 PROCEDURE — 1036F TOBACCO NON-USER: CPT | Performed by: INTERNAL MEDICINE

## 2024-07-16 PROCEDURE — G8427 DOCREV CUR MEDS BY ELIG CLIN: HCPCS | Performed by: INTERNAL MEDICINE

## 2024-07-16 PROCEDURE — 3051F HG A1C>EQUAL 7.0%<8.0%: CPT | Performed by: INTERNAL MEDICINE

## 2024-07-16 PROCEDURE — 99214 OFFICE O/P EST MOD 30 MIN: CPT | Performed by: INTERNAL MEDICINE

## 2024-07-16 PROCEDURE — 2022F DILAT RTA XM EVC RTNOPTHY: CPT | Performed by: INTERNAL MEDICINE

## 2024-07-16 PROCEDURE — G8417 CALC BMI ABV UP PARAM F/U: HCPCS | Performed by: INTERNAL MEDICINE

## 2024-07-16 ASSESSMENT — ENCOUNTER SYMPTOMS
DIARRHEA: 0
SHORTNESS OF BREATH: 0
VOMITING: 0
EYE ITCHING: 0
NAUSEA: 0
VOICE CHANGE: 0
WHEEZING: 0
ABDOMINAL PAIN: 0
SORE THROAT: 0
COUGH: 0
CHEST TIGHTNESS: 0
RHINORRHEA: 0

## 2024-07-16 NOTE — PROGRESS NOTES
Subjective:             Shayne Murphy is a 38 y.o. male who complains today of:     Chief Complaint   Patient presents with    Follow-up     1 yr f/u for PFT results       HPI  He had PFT done and came for f/u   He work as  in [place]. He needs PFT for job.   No C/o shortness of breath .  No Wheezing.   No Cough.  No Hemoptysis. No Chest tightness.   No Chest pain with radiation  or pleuritic pain.  No  leg edema.   No orthopnea.  No Fever or chills.   No Rhinorrhea and postnasal drip.  No h/o smoking       INTERPRETATION:  FVC is 5.79, 102% of predicted.  FEV1 is 4.63, 101% of predicted.  FEV1/FVC is 4.45, 100% of predicted.  Post-bronchodilator study shows no significant improvement.  Lung volume study shows residual volume is 2.20, 116% of predicted.  TLC is 8.08, 110% of predicted.  RV/TLC ratio is 27, 106% of predicted.  Diffusion capacity is 38.63, 115% of predicted.     SUMMARY:  Normal spirometry.  There is no response to bronchodilator.  Static lung volume within normal range.  Diffusion capacity normal.  Airway resistance is normal.  Clinical correlation is requested.    Allergies:  Patient has no known allergies.  Past Medical History:   Diagnosis Date    DM w/ coma type I, uncontrolled 6/11/2021     Past Surgical History:   Procedure Laterality Date    CATARACT EXTRACTION Bilateral 2022    ERCP N/A 7/10/2023    ERCP DIAGNOSTIC performed by Gabo Marin MD at [facility]    ERCP N/A 8/31/2023    ERCP STENT REMOVAL with balloon sweep performed by Gabo Marin MD at [facility]    VASECTOMY  2022     Family History   Problem Relation Age of Onset    Heart Attack Father     Colon Cancer Neg Hx      Social History     Socioeconomic History    Marital status:      Spouse name: Not on file    Number of children: Not on file    Years of education: Not on file    Highest education level: Not on file   Occupational History    Not on file   Tobacco Use    Smoking

## 2024-08-07 ENCOUNTER — TELEPHONE (OUTPATIENT)
Dept: ENDOCRINOLOGY | Age: 39
End: 2024-08-07

## 2024-10-04 ENCOUNTER — OFFICE VISIT (OUTPATIENT)
Dept: ENDOCRINOLOGY | Age: 39
End: 2024-10-04
Payer: COMMERCIAL

## 2024-10-04 VITALS
HEIGHT: 74 IN | OXYGEN SATURATION: 97 % | SYSTOLIC BLOOD PRESSURE: 118 MMHG | WEIGHT: 253 LBS | DIASTOLIC BLOOD PRESSURE: 70 MMHG | HEART RATE: 82 BPM | BODY MASS INDEX: 32.47 KG/M2

## 2024-10-04 DIAGNOSIS — E87.0 TYPE I DIABETES MELLITUS WITH HYPEROSMOLAR COMA (HCC): Primary | ICD-10-CM

## 2024-10-04 DIAGNOSIS — E10.65 TYPE I DIABETES MELLITUS WITH HYPEROSMOLAR COMA (HCC): Primary | ICD-10-CM

## 2024-10-04 DIAGNOSIS — E10.69 TYPE I DIABETES MELLITUS WITH HYPEROSMOLAR COMA (HCC): Primary | ICD-10-CM

## 2024-10-04 DIAGNOSIS — E10.65 TYPE I DIABETES MELLITUS WITH HYPEROSMOLAR COMA (HCC): ICD-10-CM

## 2024-10-04 DIAGNOSIS — E87.0 TYPE I DIABETES MELLITUS WITH HYPEROSMOLAR COMA (HCC): ICD-10-CM

## 2024-10-04 DIAGNOSIS — E10.69 TYPE I DIABETES MELLITUS WITH HYPEROSMOLAR COMA (HCC): ICD-10-CM

## 2024-10-04 LAB
ANION GAP SERPL CALCULATED.3IONS-SCNC: 11 MEQ/L (ref 9–15)
BUN SERPL-MCNC: 16 MG/DL (ref 6–20)
CALCIUM SERPL-MCNC: 8.9 MG/DL (ref 8.5–9.9)
CHLORIDE SERPL-SCNC: 106 MEQ/L (ref 95–107)
CHP ED QC CHECK: NORMAL
CO2 SERPL-SCNC: 28 MEQ/L (ref 20–31)
CREAT SERPL-MCNC: 0.97 MG/DL (ref 0.7–1.2)
ESTIMATED AVERAGE GLUCOSE: 166 MG/DL
GLUCOSE BLD-MCNC: 71 MG/DL
GLUCOSE SERPL-MCNC: 73 MG/DL (ref 70–99)
HBA1C MFR BLD: 7.4 % (ref 4–6)
POTASSIUM SERPL-SCNC: 3.6 MEQ/L (ref 3.4–4.9)
SODIUM SERPL-SCNC: 145 MEQ/L (ref 135–144)

## 2024-10-04 PROCEDURE — 82962 GLUCOSE BLOOD TEST: CPT | Performed by: INTERNAL MEDICINE

## 2024-10-04 PROCEDURE — G8484 FLU IMMUNIZE NO ADMIN: HCPCS | Performed by: INTERNAL MEDICINE

## 2024-10-04 PROCEDURE — 1036F TOBACCO NON-USER: CPT | Performed by: INTERNAL MEDICINE

## 2024-10-04 PROCEDURE — G8417 CALC BMI ABV UP PARAM F/U: HCPCS | Performed by: INTERNAL MEDICINE

## 2024-10-04 PROCEDURE — 99213 OFFICE O/P EST LOW 20 MIN: CPT | Performed by: INTERNAL MEDICINE

## 2024-10-04 PROCEDURE — 3051F HG A1C>EQUAL 7.0%<8.0%: CPT | Performed by: INTERNAL MEDICINE

## 2024-10-04 PROCEDURE — 2022F DILAT RTA XM EVC RTNOPTHY: CPT | Performed by: INTERNAL MEDICINE

## 2024-10-04 PROCEDURE — G8427 DOCREV CUR MEDS BY ELIG CLIN: HCPCS | Performed by: INTERNAL MEDICINE

## 2024-10-04 RX ORDER — INSULIN GLARGINE 100 [IU]/ML
INJECTION, SOLUTION SUBCUTANEOUS
Qty: 15 ML | Refills: 3 | Status: SHIPPED | OUTPATIENT
Start: 2024-10-04

## 2024-10-04 RX ORDER — INSULIN HUMAN 4-8-12(60)
KIT INHALATION
Qty: 360 EACH | Refills: 3 | Status: SHIPPED | OUTPATIENT
Start: 2024-10-04

## 2024-10-04 RX ORDER — ACYCLOVIR 400 MG/1
TABLET ORAL
Qty: 3 EACH | Refills: 0 | Status: SHIPPED | OUTPATIENT
Start: 2024-10-04

## 2024-10-04 RX ORDER — INSULIN HUMAN 4-8-12(60)
KIT INHALATION
Qty: 2 EACH | Refills: 0 | Status: SHIPPED | OUTPATIENT
Start: 2024-10-04

## 2024-10-04 RX ORDER — PEN NEEDLE, DIABETIC 32GX 5/32"
NEEDLE, DISPOSABLE MISCELLANEOUS
Qty: 100 EACH | Refills: 3 | Status: SHIPPED | OUTPATIENT
Start: 2024-10-04

## 2024-10-04 NOTE — PROGRESS NOTES
10/4/2024    Assessment:       Diagnosis Orders   1. Type I diabetes mellitus with hyperosmolar coma (HCC)  POCT Glucose            PLAN:     Orders Placed This Encounter   Procedures    POCT Glucose     Orders Placed This Encounter   Medications    BASAGLAR KWIKPEN 100 UNIT/ML injection pen     Sig: INJECT 20 UNITS SUBUTANEOUSLY IN THE MORNING AND INJECT 30 UNITS SUBUTANEOUSLY IN THE EVENING.     Dispense:  15 mL     Refill:  3    Continuous Glucose Sensor (DEXCOM G7 SENSOR) MISC     Sig: Change every 10 days     Dispense:  3 each     Refill:  0    Insulin Pen Needle (BD PEN NEEDLE JUAN DANIEL 2ND GEN) 32G X 4 MM MISC     Sig: USE 1 PEN NEEDLE 4 TIMES DAILY     Dispense:  100 each     Refill:  3    Insulin Regular Human (AFREZZA) 60x4 &60x8 & 60x12 UNIT POWD     Sig: Lot [id number] Exp 10/22 2 boxes were given     Dispense:  2 each     Refill:  0         Orders Placed This Encounter   Procedures    POCT Glucose     No orders of the defined types were placed in this encounter.    No follow-ups on file.  Subjective:     Chief Complaint   Patient presents with    Diabetes     Vitals:    10/04/24 0854   BP: 118/70   Site: Left Upper Arm   Position: Sitting   Cuff Size: Large Adult   Pulse: 82   SpO2: 97%   Weight: 114.8 kg (253 lb)   Height: 1.88 m (6' 2.02\")     Wt Readings from Last 3 Encounters:   10/04/24 114.8 kg (253 lb)   07/16/24 115.7 kg (255 lb)   07/01/24 116.6 kg (257 lb)     BP Readings from Last 3 Encounters:   10/04/24 118/70   07/16/24 128/72   07/01/24 123/80       Follow-up on type 1 diabetes on Basaglar 20 units in the morning 30 units in the evening plus Humalog 20 to 30 units with each meals also using  Afrezza inhaled insulin 8 to 24 units with with each meals but not using Humalog using Dexcom continuous glucose monitoring 2-week glucose average was  Labs today 2-week glucose average was 211  31% in range 43% slightly high 26% very high no hypoglycemia      Diabetes  He presents for his follow-up diabetic

## 2024-11-18 RX ORDER — INSULIN HUMAN 4-8-12(60)
KIT INHALATION
Qty: 2 EACH | Refills: 0 | Status: SHIPPED | OUTPATIENT
Start: 2024-11-18

## 2024-11-19 RX ORDER — INSULIN HUMAN 4-8-12(60)
KIT INHALATION
Qty: 360 EACH | Refills: 3 | Status: SHIPPED | OUTPATIENT
Start: 2024-11-19

## 2024-12-10 DIAGNOSIS — E87.0 TYPE I DIABETES MELLITUS WITH HYPEROSMOLAR COMA (HCC): ICD-10-CM

## 2024-12-10 DIAGNOSIS — E10.65 TYPE I DIABETES MELLITUS WITH HYPEROSMOLAR COMA (HCC): ICD-10-CM

## 2024-12-10 DIAGNOSIS — E10.69 TYPE I DIABETES MELLITUS WITH HYPEROSMOLAR COMA (HCC): ICD-10-CM

## 2024-12-10 RX ORDER — INSULIN GLARGINE 100 [IU]/ML
INJECTION, SOLUTION SUBCUTANEOUS
Qty: 15 ML | Refills: 3 | Status: SHIPPED | OUTPATIENT
Start: 2024-12-10

## 2024-12-10 RX ORDER — INSULIN GLARGINE 100 [IU]/ML
INJECTION, SOLUTION SUBCUTANEOUS
Qty: 15 ML | Refills: 3
Start: 2024-12-10 | End: 2024-12-10 | Stop reason: SDUPTHER

## 2024-12-18 DIAGNOSIS — E87.0 TYPE I DIABETES MELLITUS WITH HYPEROSMOLAR COMA (HCC): Primary | ICD-10-CM

## 2024-12-18 DIAGNOSIS — E10.69 TYPE I DIABETES MELLITUS WITH HYPEROSMOLAR COMA (HCC): Primary | ICD-10-CM

## 2024-12-18 DIAGNOSIS — R53.83 OTHER FATIGUE: ICD-10-CM

## 2024-12-18 DIAGNOSIS — E10.65 TYPE I DIABETES MELLITUS WITH HYPEROSMOLAR COMA (HCC): Primary | ICD-10-CM

## 2024-12-19 DIAGNOSIS — R53.83 OTHER FATIGUE: ICD-10-CM

## 2024-12-19 DIAGNOSIS — E87.0 TYPE I DIABETES MELLITUS WITH HYPEROSMOLAR COMA (HCC): ICD-10-CM

## 2024-12-19 DIAGNOSIS — E10.69 TYPE I DIABETES MELLITUS WITH HYPEROSMOLAR COMA (HCC): ICD-10-CM

## 2024-12-19 DIAGNOSIS — E10.65 TYPE I DIABETES MELLITUS WITH HYPEROSMOLAR COMA (HCC): ICD-10-CM

## 2024-12-19 LAB
ALBUMIN SERPL-MCNC: 4.4 G/DL (ref 3.5–4.6)
ALP SERPL-CCNC: 119 U/L (ref 35–104)
ALT SERPL-CCNC: 18 U/L (ref 0–41)
ANION GAP SERPL CALCULATED.3IONS-SCNC: 8 MEQ/L (ref 9–15)
AST SERPL-CCNC: 22 U/L (ref 0–40)
BILIRUB DIRECT SERPL-MCNC: <0.2 MG/DL (ref 0–0.4)
BILIRUB INDIRECT SERPL-MCNC: NORMAL MG/DL (ref 0–0.6)
BILIRUB SERPL-MCNC: 0.4 MG/DL (ref 0.2–0.7)
BUN SERPL-MCNC: 14 MG/DL (ref 6–20)
CALCIUM SERPL-MCNC: 9.3 MG/DL (ref 8.5–9.9)
CHLORIDE SERPL-SCNC: 104 MEQ/L (ref 95–107)
CHOLEST SERPL-MCNC: 161 MG/DL (ref 0–199)
CO2 SERPL-SCNC: 29 MEQ/L (ref 20–31)
CREAT SERPL-MCNC: 0.97 MG/DL (ref 0.7–1.2)
ERYTHROCYTE [DISTWIDTH] IN BLOOD BY AUTOMATED COUNT: 12.7 % (ref 11.5–14.5)
FOLATE: 9.8 NG/ML (ref 4.8–24.2)
GLOBULIN SER CALC-MCNC: 2.8 G/DL (ref 2.3–3.5)
GLUCOSE SERPL-MCNC: 81 MG/DL (ref 70–99)
HCT VFR BLD AUTO: 52 % (ref 42–52)
HDLC SERPL-MCNC: 32 MG/DL (ref 40–59)
HGB BLD-MCNC: 16.9 G/DL (ref 14–18)
LDLC SERPL CALC-MCNC: 105 MG/DL (ref 0–129)
MCH RBC QN AUTO: 27 PG (ref 27–31.3)
MCHC RBC AUTO-ENTMCNC: 32.5 % (ref 33–37)
MCV RBC AUTO: 83.2 FL (ref 79–92.2)
PLATELET # BLD AUTO: 268 K/UL (ref 130–400)
POTASSIUM SERPL-SCNC: 3.7 MEQ/L (ref 3.4–4.9)
PROT SERPL-MCNC: 7.2 G/DL (ref 6.3–8)
RBC # BLD AUTO: 6.25 M/UL (ref 4.7–6.1)
SODIUM SERPL-SCNC: 141 MEQ/L (ref 135–144)
TRIGL SERPL-MCNC: 119 MG/DL (ref 0–150)
TSH SERPL-MCNC: 1.67 UIU/ML (ref 0.44–3.86)
VITAMIN B-12: 375 PG/ML (ref 232–1245)
WBC # BLD AUTO: 8.2 K/UL (ref 4.8–10.8)

## 2025-01-03 ENCOUNTER — OFFICE VISIT (OUTPATIENT)
Dept: ENDOCRINOLOGY | Age: 40
End: 2025-01-03

## 2025-01-03 VITALS
OXYGEN SATURATION: 95 % | HEIGHT: 74 IN | DIASTOLIC BLOOD PRESSURE: 78 MMHG | SYSTOLIC BLOOD PRESSURE: 135 MMHG | WEIGHT: 254 LBS | BODY MASS INDEX: 32.6 KG/M2

## 2025-01-03 DIAGNOSIS — E10.69 TYPE I DIABETES MELLITUS WITH HYPEROSMOLAR COMA (HCC): ICD-10-CM

## 2025-01-03 DIAGNOSIS — E10.65 UNCONTROLLED TYPE 1 DIABETES MELLITUS WITH HYPEROSMOLARITY (HCC): Primary | ICD-10-CM

## 2025-01-03 DIAGNOSIS — E10.69 UNCONTROLLED TYPE 1 DIABETES MELLITUS WITH HYPEROSMOLARITY (HCC): Primary | ICD-10-CM

## 2025-01-03 DIAGNOSIS — E87.0 TYPE I DIABETES MELLITUS WITH HYPEROSMOLAR COMA (HCC): ICD-10-CM

## 2025-01-03 DIAGNOSIS — E10.65 TYPE I DIABETES MELLITUS WITH HYPEROSMOLAR COMA (HCC): ICD-10-CM

## 2025-01-03 DIAGNOSIS — E87.0 UNCONTROLLED TYPE 1 DIABETES MELLITUS WITH HYPEROSMOLARITY (HCC): Primary | ICD-10-CM

## 2025-01-03 LAB
ANION GAP SERPL CALCULATED.3IONS-SCNC: 12 MEQ/L (ref 9–15)
BUN SERPL-MCNC: 13 MG/DL (ref 6–20)
CALCIUM SERPL-MCNC: 9.4 MG/DL (ref 8.5–9.9)
CHLORIDE SERPL-SCNC: 104 MEQ/L (ref 95–107)
CO2 SERPL-SCNC: 26 MEQ/L (ref 20–31)
CREAT SERPL-MCNC: 0.92 MG/DL (ref 0.7–1.2)
ESTIMATED AVERAGE GLUCOSE: 160 MG/DL
GLUCOSE SERPL-MCNC: 77 MG/DL (ref 70–99)
HBA1C MFR BLD: 7.2 % (ref 4–6)
POTASSIUM SERPL-SCNC: 4.2 MEQ/L (ref 3.4–4.9)
SODIUM SERPL-SCNC: 142 MEQ/L (ref 135–144)

## 2025-01-03 ASSESSMENT — ENCOUNTER SYMPTOMS
VISUAL CHANGE: 0
EYES NEGATIVE: 1

## 2025-01-03 NOTE — PROGRESS NOTES
1/3/2025    Assessment:       Diagnosis Orders   1. Uncontrolled type 1 diabetes mellitus with hyperosmolarity (HCC)              PLAN:     Orders Placed This Encounter   Procedures    GLUCOSE MONITOR, 72 HOUR, PHYS INTERP    Hemoglobin A1C     Standing Status:   Future     Number of Occurrences:   1     Standing Expiration Date:   1/3/2026    Basic Metabolic Panel, Fasting     Standing Status:   Future     Standing Expiration Date:   1/3/2026    Basic Metabolic Panel     Standing Status:   Future     Number of Occurrences:   1     Standing Expiration Date:   1/3/2026     DIABETES FOOT EXAM     Continue patient on current dose of Basaglar  Continue current dose of Afrezza  Patient cleared to work at [employer] without any restrictions from endocrinology        Subjective:     Chief Complaint   Patient presents with    Diabetes    CGM     Dexcom G7 emanuel     Vitals:    01/03/25 0903   BP: 135/78   SpO2: 95%   Weight: 115.2 kg (254 lb)   Height: 1.88 m (6' 2\")     Wt Readings from Last 3 Encounters:   01/03/25 115.2 kg (254 lb)   10/04/24 114.8 kg (253 lb)   07/16/24 115.7 kg (255 lb)     BP Readings from Last 3 Encounters:   01/03/25 135/78   10/04/24 118/70   07/16/24 128/72       Follow-up on type 1 diabetes patient currently on Basaglar 23 units in the morning 33 units in the evening  Plus Afrezza between 8 to 24 units with each meals  Patient following up with ophthalmologist pulmonologist using   Dexcom CGM 2-week glucose average was 168  62% in range 32% slightly high 5% very high no hypoglycemia    Last hemoglobin A1c was 7.4 will do another A1c today  Hemoglobin A1C       Date                     Value               Ref Range           Status                10/04/2024               7.4 (H)             4.0 - 6.0 %         Final            ----------      Diabetes  He presents for his follow-up diabetic visit. He has type 1 diabetes mellitus. Pertinent negatives for diabetes include no foot ulcerations, no polyuria

## 2025-01-30 ENCOUNTER — TELEPHONE (OUTPATIENT)
Dept: ENDOCRINOLOGY | Age: 40
End: 2025-01-30

## 2025-01-30 DIAGNOSIS — E10.65 TYPE I DIABETES MELLITUS WITH HYPEROSMOLAR COMA (HCC): Primary | ICD-10-CM

## 2025-01-30 DIAGNOSIS — E87.0 TYPE I DIABETES MELLITUS WITH HYPEROSMOLAR COMA (HCC): Primary | ICD-10-CM

## 2025-01-30 DIAGNOSIS — E10.69 TYPE I DIABETES MELLITUS WITH HYPEROSMOLAR COMA (HCC): Primary | ICD-10-CM

## 2025-01-30 NOTE — TELEPHONE ENCOUNTER
Patient was last seen 1/3/2025 by Dr Newman.  He is calling in because he works for the [employer] and they want him to be seen by Dr Newman again for a clearance appt by 3/1/2025.  He said the [employer] has the documentation from 1/3/2025 visit but are still wanting him to be seen again closer to 3/1/2025 for clearance.  At time of call no open appts available, please call him to advise.

## 2025-02-03 RX ORDER — INSULIN HUMAN 4-8-12(60)
KIT INHALATION
Qty: 360 EACH | Refills: 3 | Status: SHIPPED | OUTPATIENT
Start: 2025-02-03

## 2025-02-24 ENCOUNTER — TELEPHONE (OUTPATIENT)
Dept: ENDOCRINOLOGY | Age: 40
End: 2025-02-24

## 2025-02-26 ENCOUNTER — OFFICE VISIT (OUTPATIENT)
Dept: ENDOCRINOLOGY | Age: 40
End: 2025-02-26

## 2025-02-26 ENCOUNTER — TELEPHONE (OUTPATIENT)
Dept: ENDOCRINOLOGY | Age: 40
End: 2025-02-26

## 2025-02-26 VITALS
HEIGHT: 74 IN | DIASTOLIC BLOOD PRESSURE: 73 MMHG | SYSTOLIC BLOOD PRESSURE: 133 MMHG | BODY MASS INDEX: 33.5 KG/M2 | WEIGHT: 261 LBS | OXYGEN SATURATION: 95 %

## 2025-02-26 DIAGNOSIS — E10.65 TYPE I DIABETES MELLITUS WITH HYPEROSMOLAR COMA (HCC): ICD-10-CM

## 2025-02-26 DIAGNOSIS — E10.65 UNCONTROLLED TYPE 1 DIABETES MELLITUS WITH HYPEROSMOLARITY (HCC): Primary | ICD-10-CM

## 2025-02-26 DIAGNOSIS — E87.0 TYPE I DIABETES MELLITUS WITH HYPEROSMOLAR COMA (HCC): ICD-10-CM

## 2025-02-26 DIAGNOSIS — E10.69 UNCONTROLLED TYPE 1 DIABETES MELLITUS WITH HYPEROSMOLARITY (HCC): Primary | ICD-10-CM

## 2025-02-26 DIAGNOSIS — E87.0 UNCONTROLLED TYPE 1 DIABETES MELLITUS WITH HYPEROSMOLARITY (HCC): ICD-10-CM

## 2025-02-26 DIAGNOSIS — E10.69 TYPE I DIABETES MELLITUS WITH HYPEROSMOLAR COMA (HCC): ICD-10-CM

## 2025-02-26 DIAGNOSIS — E10.69 UNCONTROLLED TYPE 1 DIABETES MELLITUS WITH HYPEROSMOLARITY (HCC): ICD-10-CM

## 2025-02-26 DIAGNOSIS — E10.65 UNCONTROLLED TYPE 1 DIABETES MELLITUS WITH HYPEROSMOLARITY (HCC): ICD-10-CM

## 2025-02-26 DIAGNOSIS — E87.0 UNCONTROLLED TYPE 1 DIABETES MELLITUS WITH HYPEROSMOLARITY (HCC): Primary | ICD-10-CM

## 2025-02-26 LAB
ANION GAP SERPL CALCULATED.3IONS-SCNC: 11 MEQ/L (ref 9–15)
BUN SERPL-MCNC: 14 MG/DL (ref 6–20)
CALCIUM SERPL-MCNC: 8.9 MG/DL (ref 8.5–9.9)
CHLORIDE SERPL-SCNC: 101 MEQ/L (ref 95–107)
CHP ED QC CHECK: NORMAL
CO2 SERPL-SCNC: 28 MEQ/L (ref 20–31)
CREAT SERPL-MCNC: 0.92 MG/DL (ref 0.7–1.2)
GLUCOSE BLD-MCNC: 113 MG/DL
GLUCOSE SERPL-MCNC: 84 MG/DL (ref 70–99)
POTASSIUM SERPL-SCNC: 3.8 MEQ/L (ref 3.4–4.9)
SODIUM SERPL-SCNC: 140 MEQ/L (ref 135–144)

## 2025-02-26 RX ORDER — ACYCLOVIR 400 MG/1
TABLET ORAL
Qty: 3 EACH | Refills: 5 | Status: SHIPPED | OUTPATIENT
Start: 2025-02-26

## 2025-02-26 RX ORDER — ACYCLOVIR 400 MG/1
TABLET ORAL
Qty: 3 EACH | Refills: 5 | Status: CANCELLED | OUTPATIENT
Start: 2025-02-26

## 2025-02-26 ASSESSMENT — ENCOUNTER SYMPTOMS
EYES NEGATIVE: 1
VISUAL CHANGE: 0

## 2025-02-26 NOTE — PROGRESS NOTES
2/26/2025    Assessment:       Diagnosis Orders   1. Uncontrolled type 1 diabetes mellitus with hyperosmolarity (HCC)              PLAN:     Orders Placed This Encounter   Procedures    GLUCOSE MONITOR, 72 HOUR, PHYS INTERP    Basic Metabolic Panel     Standing Status:   Future     Standing Expiration Date:   2/26/2026    Hemoglobin A1C     Standing Status:   Future     Standing Expiration Date:   2/26/2026    Albumin/Creatinine Ratio, Urine     Standing Status:   Future     Standing Expiration Date:   2/26/2026    POCT Glucose     Orders Placed This Encounter   Medications    Continuous Glucose Sensor (DEXCOM G7 SENSOR) MISC     Sig: Change every 10 days     Dispense:  3 each     Refill:  5     Continue current dose of Basaglar continue current dose of Afrezza/regular insulin as needed  Continue using Dexcom  Follow-up in 3 months    Orders Placed This Encounter   Procedures    POCT Glucose     No orders of the defined types were placed in this encounter.    No follow-ups on file.  Subjective:     Chief Complaint   Patient presents with    Diabetes    CGM     Dexcom G7 emanuel linked      Vitals:    02/26/25 0923   BP: 133/73   SpO2: 95%   Weight: 118.4 kg (261 lb)   Height: 1.88 m (6' 2\")     Wt Readings from Last 3 Encounters:   02/26/25 118.4 kg (261 lb)   01/03/25 115.2 kg (254 lb)   10/04/24 114.8 kg (253 lb)     BP Readings from Last 3 Encounters:   02/26/25 133/73   01/03/25 135/78   10/04/24 118/70     4 week   Follow-up on type 1 diabetes patient currently on Basaglar 23 units in the morning 33 units in the evening  Plus Afrezza between 8 to 24 units with each meals    using Dexcom     CGM to be glucose average was 149  79% in range 80% slightly high 2% very high 1% mild hypoglycemia less than 1% severe hypoglycemia      Hemoglobin A1C       Date                     Value               Ref Range           Status                01/03/2025               7.2 (H)             4.0 - 6.0 %         Final

## 2025-02-27 LAB
ESTIMATED AVERAGE GLUCOSE: 146 MG/DL
HBA1C MFR BLD: 6.7 % (ref 4–6)

## 2025-03-10 DIAGNOSIS — E10.65 UNCONTROLLED TYPE 1 DIABETES MELLITUS WITH HYPEROSMOLARITY (HCC): ICD-10-CM

## 2025-03-10 DIAGNOSIS — E87.0 UNCONTROLLED TYPE 1 DIABETES MELLITUS WITH HYPEROSMOLARITY (HCC): ICD-10-CM

## 2025-03-10 DIAGNOSIS — E10.69 UNCONTROLLED TYPE 1 DIABETES MELLITUS WITH HYPEROSMOLARITY (HCC): ICD-10-CM

## 2025-03-10 RX ORDER — ACYCLOVIR 400 MG/1
TABLET ORAL
Qty: 3 EACH | Refills: 5 | Status: SHIPPED | OUTPATIENT
Start: 2025-03-10

## 2025-03-26 DIAGNOSIS — E10.65 TYPE I DIABETES MELLITUS WITH HYPEROSMOLAR COMA (HCC): ICD-10-CM

## 2025-03-26 DIAGNOSIS — E87.0 TYPE I DIABETES MELLITUS WITH HYPEROSMOLAR COMA (HCC): ICD-10-CM

## 2025-03-26 DIAGNOSIS — E10.69 TYPE I DIABETES MELLITUS WITH HYPEROSMOLAR COMA (HCC): ICD-10-CM

## 2025-03-26 RX ORDER — INSULIN GLARGINE 100 [IU]/ML
INJECTION, SOLUTION SUBCUTANEOUS
Qty: 15 ML | Refills: 3 | Status: SHIPPED | OUTPATIENT
Start: 2025-03-26

## 2025-04-23 RX ORDER — INSULIN HUMAN 4-8-12(60)
KIT INHALATION
Qty: 360 EACH | Refills: 3 | Status: SHIPPED | OUTPATIENT
Start: 2025-04-23

## 2025-05-19 ENCOUNTER — TELEPHONE (OUTPATIENT)
Age: 40
End: 2025-05-19

## 2025-05-29 DIAGNOSIS — E10.65 TYPE I DIABETES MELLITUS WITH HYPEROSMOLAR COMA (HCC): Primary | ICD-10-CM

## 2025-05-29 DIAGNOSIS — E10.69 TYPE I DIABETES MELLITUS WITH HYPEROSMOLAR COMA (HCC): Primary | ICD-10-CM

## 2025-05-29 DIAGNOSIS — E87.0 TYPE I DIABETES MELLITUS WITH HYPEROSMOLAR COMA (HCC): Primary | ICD-10-CM

## 2025-05-29 RX ORDER — INSULIN GLARGINE-YFGN 100 [IU]/ML
INJECTION, SOLUTION SUBCUTANEOUS
Qty: 15 ML | Refills: 3 | Status: SHIPPED | OUTPATIENT
Start: 2025-05-29

## 2025-05-30 ENCOUNTER — OFFICE VISIT (OUTPATIENT)
Age: 40
End: 2025-05-30

## 2025-05-30 VITALS
BODY MASS INDEX: 32.93 KG/M2 | OXYGEN SATURATION: 98 % | DIASTOLIC BLOOD PRESSURE: 88 MMHG | WEIGHT: 256.6 LBS | SYSTOLIC BLOOD PRESSURE: 120 MMHG | HEART RATE: 79 BPM

## 2025-05-30 VITALS
HEART RATE: 76 BPM | BODY MASS INDEX: 32.47 KG/M2 | HEIGHT: 74 IN | DIASTOLIC BLOOD PRESSURE: 82 MMHG | SYSTOLIC BLOOD PRESSURE: 131 MMHG | WEIGHT: 253 LBS

## 2025-05-30 DIAGNOSIS — E87.0 TYPE I DIABETES MELLITUS WITH HYPEROSMOLAR COMA (HCC): Primary | ICD-10-CM

## 2025-05-30 DIAGNOSIS — Z00.00 PE (PHYSICAL EXAM), ANNUAL: ICD-10-CM

## 2025-05-30 DIAGNOSIS — E87.0 TYPE I DIABETES MELLITUS WITH HYPEROSMOLAR COMA (HCC): ICD-10-CM

## 2025-05-30 DIAGNOSIS — E10.65 TYPE I DIABETES MELLITUS WITH HYPEROSMOLAR COMA (HCC): ICD-10-CM

## 2025-05-30 DIAGNOSIS — Z00.00 ROUTINE ADULT HEALTH MAINTENANCE: Primary | ICD-10-CM

## 2025-05-30 DIAGNOSIS — E10.69 TYPE I DIABETES MELLITUS WITH HYPEROSMOLAR COMA (HCC): ICD-10-CM

## 2025-05-30 DIAGNOSIS — E10.69 TYPE I DIABETES MELLITUS WITH HYPEROSMOLAR COMA (HCC): Primary | ICD-10-CM

## 2025-05-30 DIAGNOSIS — E10.65 TYPE I DIABETES MELLITUS WITH HYPEROSMOLAR COMA (HCC): Primary | ICD-10-CM

## 2025-05-30 LAB
ANION GAP SERPL CALCULATED.3IONS-SCNC: 11 MEQ/L (ref 9–15)
BUN SERPL-MCNC: 17 MG/DL (ref 6–20)
CALCIUM SERPL-MCNC: 10 MG/DL (ref 8.5–9.9)
CHLORIDE SERPL-SCNC: 104 MEQ/L (ref 95–107)
CHP ED QC CHECK: ABNORMAL
CO2 SERPL-SCNC: 29 MEQ/L (ref 20–31)
CREAT SERPL-MCNC: 0.93 MG/DL (ref 0.7–1.2)
CREAT UR-MCNC: 156.9 MG/DL
ESTIMATED AVERAGE GLUCOSE: 151 MG/DL
GLUCOSE BLD-MCNC: 72 MG/DL
GLUCOSE SERPL-MCNC: 51 MG/DL (ref 70–99)
HBA1C MFR BLD: 6.9 % (ref 4–6)
MICROALBUMIN UR-MCNC: 2.2 MG/DL
MICROALBUMIN/CREAT UR-RTO: 14 MG/G (ref 0–30)
POTASSIUM SERPL-SCNC: 3.8 MEQ/L (ref 3.4–4.9)
SODIUM SERPL-SCNC: 144 MEQ/L (ref 135–144)

## 2025-05-30 ASSESSMENT — ENCOUNTER SYMPTOMS
GASTROINTESTINAL NEGATIVE: 1
EYES NEGATIVE: 1
WHEEZING: 0
RESPIRATORY NEGATIVE: 1
COUGH: 0
NAUSEA: 0
BLOOD IN STOOL: 0
EYES NEGATIVE: 1
SHORTNESS OF BREATH: 0
CHEST TIGHTNESS: 0
STRIDOR: 0

## 2025-05-30 NOTE — PROGRESS NOTES
5/30/2025    Assessment:       Diagnosis Orders   1. Type I diabetes mellitus with hyperosmolar coma (HCC)  POCT Glucose            PLAN:     Orders Placed This Encounter   Procedures    GLUCOSE MONITOR, 72 HOUR, PHYS INTERP    Basic Metabolic Panel     Standing Status:   Future     Number of Occurrences:   1     Expected Date:   5/30/2025     Expiration Date:   5/30/2026    Hemoglobin A1C     Standing Status:   Future     Number of Occurrences:   1     Expected Date:   5/30/2025     Expiration Date:   5/30/2026    Albumin/Creatinine Ratio, Urine     Standing Status:   Future     Number of Occurrences:   1     Expected Date:   5/30/2025     Expiration Date:   5/30/2026    POCT Glucose     Continue current insulin regimen with Basaglar Semglee/Afrezza follow-up in 3 to 4 months time labs to be done today continue using Dexcom      Orders Placed This Encounter   Procedures    POCT Glucose     No orders of the defined types were placed in this encounter.    No follow-ups on file.  Subjective:     Chief Complaint   Patient presents with    Diabetes     CGM     Vitals:    05/30/25 0915   BP: 131/82   Pulse: 76   Weight: 114.8 kg (253 lb)   Height: 1.88 m (6' 2.02\")     Wt Readings from Last 3 Encounters:   05/30/25 114.8 kg (253 lb)   02/26/25 118.4 kg (261 lb)   01/03/25 115.2 kg (254 lb)     BP Readings from Last 3 Encounters:   05/30/25 131/82   02/26/25 133/73   01/03/25 135/78       Follow-up on type 1 diabetes patient on Basaglar/Semglee 23 units in the morning 34 units in the evening plus Afrezza 3 times daily A1c was done 3 months ago 6.7 will have labs done today   no severe hypoglycemia requiring assistance    2-week Dexcom CGM was reviewed.  Downloaded  2-week glucose average was 151  68% in range 28% was slightly high 1% very high  3% mild hypoglycemia less than 1% severe hypoglycemia    Hemoglobin A1C       Date                     Value               Ref Range           Status                02/26/2025

## 2025-05-30 NOTE — PROGRESS NOTES
OFFICE VISIT         Patient: Shayne Murphy  YOB: 1985  MRN: [id number]    Chief Complaint:  Chief Complaint   Patient presents with    Follow-up     Yearly  Denies any concerns        CV Data:    Subjective/HPI  [employer].  Pt is activ ehas no symptoms no cp exercises 4 days  A week with no issues.     5/25/23 doing very well exercise all the time no cp no sob not dizzy no falls no bleed active otherwise.     5/28/24 doing very well  still exercise 4 times /week. No cp no sob no falls no bleed.      5/30/25 doing excellent no cp no sob no bleed no edema.  Very active.  Exercises all the time going to gym. He also works on his farm.     EKG:  SR 76    +FH  Work - [employer]  Lives with wife  Nonsmoker  occ ETOH    Past Medical History:   Diagnosis Date    DM w/ coma type I, uncontrolled 6/11/2021       Past Surgical History:   Procedure Laterality Date    CATARACT EXTRACTION Bilateral 2022    ERCP N/A 7/10/2023    ERCP DIAGNOSTIC performed by Gabo Marin MD at [facility]    ERCP N/A 8/31/2023    ERCP STENT REMOVAL with balloon sweep performed by Gabo Marin MD at [facility]    VASECTOMY  2022       Family History   Problem Relation Age of Onset    Heart Attack Father     Colon Cancer Neg Hx        Social History     Socioeconomic History    Marital status:    Tobacco Use    Smoking status: Never     Passive exposure: Never    Smokeless tobacco: Never   Vaping Use    Vaping status: Never Used   Substance and Sexual Activity    Alcohol use: Yes     Alcohol/week: 4.0 standard drinks of alcohol     Types: 4 Cans of beer per week     Comment: occasionally    Drug use: Never    Sexual activity: Yes     Partners: Female     Social Drivers of Health     Financial Resource Strain: Low Risk  (6/23/2023)    Overall Financial Resource Strain (CARDIA)     Difficulty of Paying Living Expenses: Not hard at all   Transportation Needs: Unknown (6/23/2023)    PRAPARE -

## 2025-06-02 ENCOUNTER — OFFICE VISIT (OUTPATIENT)
Age: 40
End: 2025-06-02

## 2025-06-02 VITALS
HEART RATE: 87 BPM | BODY MASS INDEX: 32.34 KG/M2 | DIASTOLIC BLOOD PRESSURE: 86 MMHG | WEIGHT: 252 LBS | SYSTOLIC BLOOD PRESSURE: 140 MMHG | OXYGEN SATURATION: 98 %

## 2025-06-02 DIAGNOSIS — E66.9 OBESITY (BMI 30-39.9): ICD-10-CM

## 2025-06-02 DIAGNOSIS — Z79.899 MEDICATION MANAGEMENT: Primary | ICD-10-CM

## 2025-06-02 DIAGNOSIS — E10.9 TYPE 1 DIABETES MELLITUS WITHOUT COMPLICATION (HCC): ICD-10-CM

## 2025-06-02 ASSESSMENT — ENCOUNTER SYMPTOMS
CHEST TIGHTNESS: 0
VOMITING: 0
RHINORRHEA: 0
SHORTNESS OF BREATH: 0
DIARRHEA: 0
EYE ITCHING: 0
ABDOMINAL PAIN: 0
NAUSEA: 0
SORE THROAT: 0
COUGH: 0
WHEEZING: 0
VOICE CHANGE: 0

## 2025-06-02 NOTE — PROGRESS NOTES
Subjective:             Shayne Murphy is a 39 y.o. male who complains today of:     Chief Complaint   Patient presents with    Follow-up     1 yr f/u, needs PFT for work        HPI    He work as  in [place]. He needs PFT for job.   He is on AFREZZA powder inhalation for diabetes  No C/o shortness of breath .  No Wheezing.   No Cough.  No Hemoptysis. No Chest tightness.   No Chest pain with radiation  or pleuritic pain.  No  leg edema. No orthopnea.No Fever or chills.   No Rhinorrhea and postnasal drip.  No h/o smoking         6/27/24 PFT  SUMMARY:  Normal spirometry.  There is no response to bronchodilator.  Static lung volume within normal range.  Diffusion capacity normal.  Airway resistance is normal.  Clinical correlation is requested.    Allergies:  Patient has no known allergies.  Past Medical History:   Diagnosis Date    DM w/ coma type I, uncontrolled 6/11/2021     Past Surgical History:   Procedure Laterality Date    CATARACT EXTRACTION Bilateral 2022    ERCP N/A 7/10/2023    ERCP DIAGNOSTIC performed by Gabo Marin MD at [facility]    ERCP N/A 8/31/2023    ERCP STENT REMOVAL with balloon sweep performed by Gabo Marin MD at [facility]    VASECTOMY  2022     Family History   Problem Relation Age of Onset    Heart Attack Father     Colon Cancer Neg Hx      Social History     Socioeconomic History    Marital status:      Spouse name: Not on file    Number of children: Not on file    Years of education: Not on file    Highest education level: Not on file   Occupational History    Not on file   Tobacco Use    Smoking status: Never     Passive exposure: Never    Smokeless tobacco: Never   Vaping Use    Vaping status: Never Used   Substance and Sexual Activity    Alcohol use: Yes     Alcohol/week: 4.0 standard drinks of alcohol     Types: 4 Cans of beer per week     Comment: occasionally    Drug use: Never    Sexual activity: Yes     Partners: Female   Other

## 2025-06-17 ENCOUNTER — HOSPITAL ENCOUNTER (OUTPATIENT)
Dept: PULMONOLOGY | Age: 40
Discharge: HOME | End: 2025-06-17
Payer: COMMERCIAL

## 2025-06-17 DIAGNOSIS — Z79.899 MEDICATION MANAGEMENT: ICD-10-CM

## 2025-06-17 PROCEDURE — 94726 PLETHYSMOGRAPHY LUNG VOLUMES: CPT

## 2025-06-17 PROCEDURE — 6360000002 HC RX W HCPCS

## 2025-06-17 PROCEDURE — 94729 DIFFUSING CAPACITY: CPT

## 2025-06-17 PROCEDURE — 94060 EVALUATION OF WHEEZING: CPT

## 2025-06-17 RX ORDER — ALBUTEROL SULFATE 0.83 MG/ML
SOLUTION RESPIRATORY (INHALATION)
Status: COMPLETED
Start: 2025-06-17 | End: 2025-06-17

## 2025-06-17 RX ADMIN — ALBUTEROL SULFATE 2.5 MG: 2.5 SOLUTION RESPIRATORY (INHALATION) at 16:12

## 2025-07-04 DIAGNOSIS — E10.69 TYPE I DIABETES MELLITUS WITH HYPEROSMOLAR COMA (HCC): ICD-10-CM

## 2025-07-04 DIAGNOSIS — E10.65 TYPE I DIABETES MELLITUS WITH HYPEROSMOLAR COMA (HCC): ICD-10-CM

## 2025-07-04 DIAGNOSIS — E87.0 TYPE I DIABETES MELLITUS WITH HYPEROSMOLAR COMA (HCC): ICD-10-CM

## 2025-07-07 RX ORDER — INSULIN GLARGINE 100 [IU]/ML
INJECTION, SOLUTION SUBCUTANEOUS
Qty: 15 ML | Refills: 3 | Status: SHIPPED | OUTPATIENT
Start: 2025-07-07

## 2025-08-01 RX ORDER — INSULIN HUMAN 4-8-12(60)
KIT INHALATION
Qty: 360 EACH | Refills: 3 | Status: SHIPPED | OUTPATIENT
Start: 2025-08-01

## 2025-09-05 ENCOUNTER — OFFICE VISIT (OUTPATIENT)
Age: 40
End: 2025-09-05

## 2025-09-05 VITALS
WEIGHT: 256 LBS | OXYGEN SATURATION: 96 % | SYSTOLIC BLOOD PRESSURE: 130 MMHG | DIASTOLIC BLOOD PRESSURE: 85 MMHG | BODY MASS INDEX: 32.85 KG/M2 | HEART RATE: 86 BPM | HEIGHT: 74 IN

## 2025-09-05 DIAGNOSIS — E10.9 TYPE 1 DIABETES MELLITUS WITHOUT COMPLICATION (HCC): Primary | ICD-10-CM

## 2025-09-05 LAB
CHP ED QC CHECK: NORMAL
GLUCOSE BLD-MCNC: 90 MG/DL

## 2025-09-05 ASSESSMENT — ENCOUNTER SYMPTOMS: EYES NEGATIVE: 1

## (undated) DEVICE — TOWEL,OR,DSP,ST,BLUE,STD,4/PK,20PK/CS: Brand: MEDLINE

## (undated) DEVICE — BRUSH ENDO CLN L90.5IN SHTH DIA1.7MM BRIST DIA5-7MM 2-6MM

## (undated) DEVICE — CONMED SCOPE SAVER BITE BLOCK, 20X27 MM: Brand: SCOPE SAVER

## (undated) DEVICE — RETRIEVAL BALLOON CATHETER: Brand: EXTRACTOR™ PRO RX-S

## (undated) DEVICE — Device: Brand: ENDO SMARTCAP

## (undated) DEVICE — SYSTEM BX CAP BILI RAP EXCHG CAP LOK DEV COMPATIBLE W/ OLY

## (undated) DEVICE — GOWN,AURORA,NONREINFORCED,LARGE: Brand: MEDLINE

## (undated) DEVICE — SYRINGE, LUER SLIP, STERILE, 10ML: Brand: MEDLINE

## (undated) DEVICE — CONTAINER,SPECIMEN,OR STERILE,4OZ: Brand: MEDLINE

## (undated) DEVICE — ELECTRODE PT RET AD L9FT HI MOIST COND ADH HYDRGEL CORDED

## (undated) DEVICE — ENDOSCOPIC TRAY TRNSPRT 20.5X16.5X4.1 IN RECYCL SUGAR PULP

## (undated) DEVICE — JELLY,LUBE,STERILE,FLIP TOP,TUBE,2-OZ: Brand: MEDLINE

## (undated) DEVICE — LABEL MED MINI W/ MARKER

## (undated) DEVICE — SYRINGE MED 50ML LUERLOCK TIP

## (undated) DEVICE — GLOVE SURG SZ 8 CRM LTX FREE POLYISOPRENE POLYMER BEAD ANTI

## (undated) DEVICE — COVER,TABLE,44X90,STERILE: Brand: MEDLINE

## (undated) DEVICE — TUBING, SUCTION, 1/4" X 10', STRAIGHT: Brand: MEDLINE

## (undated) DEVICE — TUBE SET 96 MM 64 MM H2O PERISTALTIC STD AUX CHANNEL

## (undated) DEVICE — ENDO CARRY-ON PROCEDURE KIT: Brand: ENDO CARRY-ON PROCEDURE KIT

## (undated) DEVICE — GLOVE ORANGE PI 8   MSG9080

## (undated) DEVICE — SINGLE PORT MANIFOLD: Brand: NEPTUNE 2

## (undated) DEVICE — SPHINCTEROTOME: Brand: DREAMTOME™ RX 44